# Patient Record
Sex: MALE | Race: WHITE | NOT HISPANIC OR LATINO | URBAN - METROPOLITAN AREA
[De-identification: names, ages, dates, MRNs, and addresses within clinical notes are randomized per-mention and may not be internally consistent; named-entity substitution may affect disease eponyms.]

---

## 2023-03-20 ENCOUNTER — TELEPHONE (OUTPATIENT)
Dept: PRIMARY CARE | Facility: CLINIC | Age: 43
End: 2023-03-20
Payer: COMMERCIAL

## 2023-03-20 DIAGNOSIS — N52.9 VASCULOGENIC ERECTILE DYSFUNCTION, UNSPECIFIED VASCULOGENIC ERECTILE DYSFUNCTION TYPE: Primary | ICD-10-CM

## 2023-03-20 RX ORDER — SILDENAFIL 50 MG/1
100 TABLET, FILM COATED ORAL AS NEEDED
Qty: 10 TABLET | Refills: 3 | Status: SHIPPED | OUTPATIENT
Start: 2023-03-20 | End: 2023-05-30 | Stop reason: SDUPTHER

## 2023-03-20 RX ORDER — SILDENAFIL 50 MG/1
100 TABLET, FILM COATED ORAL AS NEEDED
COMMUNITY
Start: 2023-02-28 | End: 2023-03-20 | Stop reason: SDUPTHER

## 2023-03-20 RX ORDER — ALBUTEROL SULFATE 90 UG/1
AEROSOL, METERED RESPIRATORY (INHALATION)
COMMUNITY
Start: 2021-07-19 | End: 2023-05-30 | Stop reason: ALTCHOICE

## 2023-03-20 RX ORDER — PROPRANOLOL HYDROCHLORIDE 20 MG/1
TABLET ORAL
COMMUNITY
Start: 2015-12-03 | End: 2023-05-30 | Stop reason: SDUPTHER

## 2023-04-25 DIAGNOSIS — E66.09 CLASS 2 OBESITY DUE TO EXCESS CALORIES WITHOUT SERIOUS COMORBIDITY WITH BODY MASS INDEX (BMI) OF 35.0 TO 35.9 IN ADULT: Primary | ICD-10-CM

## 2023-04-25 RX ORDER — PHENTERMINE HYDROCHLORIDE 37.5 MG/1
1 TABLET ORAL DAILY
COMMUNITY
Start: 2022-06-15 | End: 2023-04-25 | Stop reason: SDUPTHER

## 2023-05-05 RX ORDER — PHENTERMINE HYDROCHLORIDE 37.5 MG/1
37.5 TABLET ORAL DAILY
Qty: 30 TABLET | Refills: 0 | Status: SHIPPED | OUTPATIENT
Start: 2023-05-05 | End: 2023-05-30 | Stop reason: SDUPTHER

## 2023-05-15 ENCOUNTER — TELEPHONE (OUTPATIENT)
Dept: PRIMARY CARE | Facility: CLINIC | Age: 43
End: 2023-05-15
Payer: COMMERCIAL

## 2023-05-15 NOTE — TELEPHONE ENCOUNTER
Pt was here in February & is having bad back pain -across extreme  Lower back.  What would be the next step?  Has been doing   Stretching.  Please advise

## 2023-05-30 ENCOUNTER — OFFICE VISIT (OUTPATIENT)
Dept: PRIMARY CARE | Facility: CLINIC | Age: 43
End: 2023-05-30
Payer: COMMERCIAL

## 2023-05-30 VITALS
HEIGHT: 69 IN | OXYGEN SATURATION: 97 % | DIASTOLIC BLOOD PRESSURE: 80 MMHG | WEIGHT: 219 LBS | SYSTOLIC BLOOD PRESSURE: 120 MMHG | TEMPERATURE: 97.8 F | BODY MASS INDEX: 32.44 KG/M2 | HEART RATE: 100 BPM

## 2023-05-30 DIAGNOSIS — E66.09 CLASS 2 OBESITY DUE TO EXCESS CALORIES WITHOUT SERIOUS COMORBIDITY WITH BODY MASS INDEX (BMI) OF 35.0 TO 35.9 IN ADULT: ICD-10-CM

## 2023-05-30 DIAGNOSIS — E66.09 CLASS 1 OBESITY DUE TO EXCESS CALORIES WITHOUT SERIOUS COMORBIDITY WITH BODY MASS INDEX (BMI) OF 32.0 TO 32.9 IN ADULT: ICD-10-CM

## 2023-05-30 DIAGNOSIS — G89.29 CHRONIC LEFT-SIDED LOW BACK PAIN WITH LEFT-SIDED SCIATICA: Primary | ICD-10-CM

## 2023-05-30 DIAGNOSIS — K21.00 GASTROESOPHAGEAL REFLUX DISEASE WITH ESOPHAGITIS WITHOUT HEMORRHAGE: ICD-10-CM

## 2023-05-30 DIAGNOSIS — M54.42 CHRONIC LEFT-SIDED LOW BACK PAIN WITH LEFT-SIDED SCIATICA: Primary | ICD-10-CM

## 2023-05-30 DIAGNOSIS — F41.8 SITUATIONAL ANXIETY: ICD-10-CM

## 2023-05-30 DIAGNOSIS — N52.9 VASCULOGENIC ERECTILE DYSFUNCTION, UNSPECIFIED VASCULOGENIC ERECTILE DYSFUNCTION TYPE: ICD-10-CM

## 2023-05-30 PROBLEM — E66.811 CLASS 1 OBESITY DUE TO EXCESS CALORIES WITHOUT SERIOUS COMORBIDITY WITH BODY MASS INDEX (BMI) OF 32.0 TO 32.9 IN ADULT: Status: ACTIVE | Noted: 2023-05-30

## 2023-05-30 PROCEDURE — 3008F BODY MASS INDEX DOCD: CPT | Performed by: FAMILY MEDICINE

## 2023-05-30 PROCEDURE — 99214 OFFICE O/P EST MOD 30 MIN: CPT | Performed by: FAMILY MEDICINE

## 2023-05-30 PROCEDURE — 20552 NJX 1/MLT TRIGGER POINT 1/2: CPT | Performed by: FAMILY MEDICINE

## 2023-05-30 PROCEDURE — 1036F TOBACCO NON-USER: CPT | Performed by: FAMILY MEDICINE

## 2023-05-30 RX ORDER — METHYLPREDNISOLONE ACETATE 80 MG/ML
80 INJECTION, SUSPENSION INTRA-ARTICULAR; INTRALESIONAL; INTRAMUSCULAR; SOFT TISSUE ONCE
Status: COMPLETED | OUTPATIENT
Start: 2023-05-30 | End: 2023-05-30

## 2023-05-30 RX ORDER — PHENTERMINE HYDROCHLORIDE 37.5 MG/1
37.5 TABLET ORAL DAILY
Qty: 30 TABLET | Refills: 0 | Status: SHIPPED | OUTPATIENT
Start: 2023-05-30 | End: 2023-06-15 | Stop reason: SDUPTHER

## 2023-05-30 RX ORDER — SILDENAFIL 50 MG/1
100 TABLET, FILM COATED ORAL AS NEEDED
Qty: 30 TABLET | Refills: 3 | Status: SHIPPED | OUTPATIENT
Start: 2023-05-30 | End: 2023-08-18 | Stop reason: SDUPTHER

## 2023-05-30 RX ORDER — LIDOCAINE HYDROCHLORIDE 10 MG/ML
1 INJECTION INFILTRATION; PERINEURAL ONCE
Status: COMPLETED | OUTPATIENT
Start: 2023-05-30 | End: 2023-05-30

## 2023-05-30 RX ORDER — PROPRANOLOL HYDROCHLORIDE 20 MG/1
20 TABLET ORAL 3 TIMES DAILY
Qty: 90 TABLET | Refills: 11 | Status: SHIPPED | OUTPATIENT
Start: 2023-05-30 | End: 2024-03-05 | Stop reason: SDUPTHER

## 2023-05-30 RX ADMIN — METHYLPREDNISOLONE ACETATE 80 MG: 80 INJECTION, SUSPENSION INTRA-ARTICULAR; INTRALESIONAL; INTRAMUSCULAR; SOFT TISSUE at 16:12

## 2023-05-30 RX ADMIN — LIDOCAINE HYDROCHLORIDE 10 MG: 10 INJECTION INFILTRATION; PERINEURAL at 16:14

## 2023-05-30 ASSESSMENT — PATIENT HEALTH QUESTIONNAIRE - PHQ9
2. FEELING DOWN, DEPRESSED OR HOPELESS: NOT AT ALL
1. LITTLE INTEREST OR PLEASURE IN DOING THINGS: NOT AT ALL
SUM OF ALL RESPONSES TO PHQ9 QUESTIONS 1 & 2: 0

## 2023-05-30 ASSESSMENT — LIFESTYLE VARIABLES
HOW OFTEN DO YOU HAVE A DRINK CONTAINING ALCOHOL: NEVER
HOW OFTEN DO YOU HAVE SIX OR MORE DRINKS ON ONE OCCASION: NEVER

## 2023-05-30 ASSESSMENT — PAIN SCALES - GENERAL: PAINLEVEL: 4

## 2023-05-30 NOTE — PROGRESS NOTES
Subjective   Patient ID: Acosta Leigh is a 43 y.o. male who presents for Back Pain (Patient is here for lower back pain for months. ).  HPI  43-year-old male presents with multiple complaints.  He is dealing with left-sided low back pain with radiation down the left leg.  He has tried over-the-counter meds and ice with minimal improvement.  No fall or injury.  No lower extremity weakness.  No loss of bowel or bladder function.  He would also like to restart medication for obesity.  Requesting multiple medication refills.  Review of Systems   Musculoskeletal:  Positive for back pain.   Psychiatric/Behavioral:  The patient is nervous/anxious.    All other systems reviewed and are negative.      Visit Vitals  /80 (BP Location: Right arm, Patient Position: Sitting, BP Cuff Size: Adult)   Pulse 100   Temp 36.6 °C (97.8 °F) (Temporal)        Objective   Physical Exam  Vitals reviewed.   Constitutional:       Appearance: Normal appearance.   HENT:      Right Ear: Tympanic membrane normal.      Left Ear: Tympanic membrane normal.   Eyes:      Pupils: Pupils are equal, round, and reactive to light.   Cardiovascular:      Rate and Rhythm: Normal rate and regular rhythm.      Heart sounds: Normal heart sounds.   Pulmonary:      Breath sounds: Normal breath sounds.   Musculoskeletal:         General: Tenderness present.   Skin:     Findings: Rash present.   Neurological:      Mental Status: He is alert.      Motor: No weakness.      Gait: Gait normal.      Deep Tendon Reflexes: Reflexes normal.   Psychiatric:         Mood and Affect: Mood normal.         Behavior: Behavior normal.         Assessment/Plan   Problem List Items Addressed This Visit          Digestive    Gastroesophageal reflux disease with esophagitis without hemorrhage       Genitourinary    Vasculogenic erectile dysfunction    Relevant Medications    sildenafil (Viagra) 50 mg tablet       Endocrine/Metabolic    Class 1 obesity due to excess  calories without serious comorbidity with body mass index (BMI) of 32.0 to 32.9 in adult       Other    Chronic left-sided low back pain with left-sided sciatica - Primary    Relevant Medications    dexAMETHasone (Decadron) injection 4 mg (Completed)    methylPREDNISolone acetate (DEPO-Medrol) injection 80 mg (Completed)    lidocaine (Xylocaine) 10 mg/mL (1 %) injection 10 mg (Completed)    Other Relevant Orders    Inject trigger point, 1 or 2    XR lumbar spine complete 4+ views    Referral to Physical Therapy    Situational anxiety    Relevant Medications    propranolol (Inderal) 20 mg tablet     Other Visit Diagnoses       Class 2 obesity due to excess calories without serious comorbidity with body mass index (BMI) of 35.0 to 35.9 in adult        Relevant Medications    phentermine (Adipex-P) 37.5 mg tablet

## 2023-05-31 ASSESSMENT — ENCOUNTER SYMPTOMS
NERVOUS/ANXIOUS: 1
BACK PAIN: 1

## 2023-06-15 ENCOUNTER — OFFICE VISIT (OUTPATIENT)
Dept: PRIMARY CARE | Facility: CLINIC | Age: 43
End: 2023-06-15
Payer: COMMERCIAL

## 2023-06-15 VITALS
HEART RATE: 103 BPM | WEIGHT: 215 LBS | HEIGHT: 69 IN | DIASTOLIC BLOOD PRESSURE: 80 MMHG | SYSTOLIC BLOOD PRESSURE: 130 MMHG | BODY MASS INDEX: 31.84 KG/M2 | OXYGEN SATURATION: 98 % | TEMPERATURE: 97.7 F

## 2023-06-15 DIAGNOSIS — E66.09 CLASS 2 OBESITY DUE TO EXCESS CALORIES WITHOUT SERIOUS COMORBIDITY WITH BODY MASS INDEX (BMI) OF 35.0 TO 35.9 IN ADULT: ICD-10-CM

## 2023-06-15 DIAGNOSIS — E66.09 CLASS 1 OBESITY DUE TO EXCESS CALORIES WITHOUT SERIOUS COMORBIDITY WITH BODY MASS INDEX (BMI) OF 32.0 TO 32.9 IN ADULT: Primary | ICD-10-CM

## 2023-06-15 PROBLEM — R53.82 CHRONIC FATIGUE: Status: ACTIVE | Noted: 2023-06-15

## 2023-06-15 PROBLEM — R03.0 BORDERLINE HYPERTENSION: Status: ACTIVE | Noted: 2023-06-15

## 2023-06-15 PROBLEM — U07.1 PNEUMONIA DUE TO COVID-19 VIRUS: Status: ACTIVE | Noted: 2023-06-15

## 2023-06-15 PROBLEM — F41.1 GENERALIZED ANXIETY DISORDER: Status: ACTIVE | Noted: 2023-06-15

## 2023-06-15 PROBLEM — J45.909 RAD (REACTIVE AIRWAY DISEASE) (HHS-HCC): Status: ACTIVE | Noted: 2023-06-15

## 2023-06-15 PROBLEM — J18.9 COMMUNITY ACQUIRED PNEUMONIA: Status: ACTIVE | Noted: 2023-06-15

## 2023-06-15 PROBLEM — J01.90 ACUTE SINUSITIS: Status: ACTIVE | Noted: 2023-06-15

## 2023-06-15 PROBLEM — J02.9 SORE THROAT: Status: ACTIVE | Noted: 2023-06-15

## 2023-06-15 PROBLEM — J12.82 PNEUMONIA DUE TO COVID-19 VIRUS: Status: ACTIVE | Noted: 2023-06-15

## 2023-06-15 PROCEDURE — 99213 OFFICE O/P EST LOW 20 MIN: CPT | Performed by: FAMILY MEDICINE

## 2023-06-15 PROCEDURE — 1036F TOBACCO NON-USER: CPT | Performed by: FAMILY MEDICINE

## 2023-06-15 PROCEDURE — 3008F BODY MASS INDEX DOCD: CPT | Performed by: FAMILY MEDICINE

## 2023-06-15 RX ORDER — PHENTERMINE HYDROCHLORIDE 37.5 MG/1
37.5 TABLET ORAL DAILY
Qty: 30 TABLET | Refills: 0 | Status: SHIPPED | OUTPATIENT
Start: 2023-06-27 | End: 2024-03-05 | Stop reason: SDUPTHER

## 2023-06-15 ASSESSMENT — PAIN SCALES - GENERAL: PAINLEVEL: 4

## 2023-06-15 NOTE — PROGRESS NOTES
Subjective   Patient ID: Acosta Leigh is a 43 y.o. male who presents for Med Refill (Patient is here for med refill).  HPI  43-year-old male for follow-up on medication for obesity.  Medication working well.  No side effects.  Overall improved functioning medication  Review of Systems  Constitutional: no chills, no fever and no night sweats.   Cardiovascular: no chest pain, no intermittent leg claudication, no lower extremity edema, no palpitations and no syncope.   Respiratory: no cough, no shortness of breath during exertion, no shortness of breath at rest and no wheezing.   Integumentary: no new skin lesions and no rashes.   Neurological: no confusion, no dizziness and no headache.   Psychiatric: no anxiety, no personality change, no depression, no anhedonia, no sleep disturbances and no substance use disorders.   Endocrine: no recent weight loss.  Visit Vitals  /80 (BP Location: Left arm, Patient Position: Sitting, BP Cuff Size: Adult)   Pulse 103   Temp 36.5 °C (97.7 °F) (Temporal)        Objective   Physical Exam  Constitutional: Alert and in no acute distress. Well developed, well nourished.   Cardiovascular: Heart rate and rhythm were normal, normal S1 and S2, no gallops, no murmurs and no pericardial rub.   Pulmonary: No respiratory distress. Clear bilateral breath sounds.   Psychiatric: Judgment and insight: Intact. Mood and affect: Normal.  Assessment/Plan   Problem List Items Addressed This Visit       Class 1 obesity due to excess calories without serious comorbidity with body mass index (BMI) of 32.0 to 32.9 in adult - Primary     Other Visit Diagnoses       Class 2 obesity due to excess calories without serious comorbidity with body mass index (BMI) of 35.0 to 35.9 in adult        Relevant Medications    phentermine (Adipex-P) 37.5 mg tablet (Start on 6/27/2023)

## 2023-08-10 ENCOUNTER — TELEPHONE (OUTPATIENT)
Dept: PRIMARY CARE | Facility: CLINIC | Age: 43
End: 2023-08-10
Payer: COMMERCIAL

## 2023-08-10 NOTE — TELEPHONE ENCOUNTER
Pt was here in June & has completed PT on back & they told him to request  An mri.  Please advise  Ty

## 2023-08-18 ENCOUNTER — OFFICE VISIT (OUTPATIENT)
Dept: PRIMARY CARE | Facility: CLINIC | Age: 43
End: 2023-08-18
Payer: COMMERCIAL

## 2023-08-18 VITALS
BODY MASS INDEX: 32.73 KG/M2 | HEART RATE: 108 BPM | WEIGHT: 221 LBS | OXYGEN SATURATION: 95 % | DIASTOLIC BLOOD PRESSURE: 78 MMHG | HEIGHT: 69 IN | TEMPERATURE: 97.7 F | SYSTOLIC BLOOD PRESSURE: 110 MMHG

## 2023-08-18 DIAGNOSIS — M54.42 CHRONIC LEFT-SIDED LOW BACK PAIN WITH LEFT-SIDED SCIATICA: Primary | ICD-10-CM

## 2023-08-18 DIAGNOSIS — N52.9 VASCULOGENIC ERECTILE DYSFUNCTION, UNSPECIFIED VASCULOGENIC ERECTILE DYSFUNCTION TYPE: ICD-10-CM

## 2023-08-18 DIAGNOSIS — G89.29 CHRONIC LEFT-SIDED LOW BACK PAIN WITH LEFT-SIDED SCIATICA: Primary | ICD-10-CM

## 2023-08-18 PROCEDURE — 99214 OFFICE O/P EST MOD 30 MIN: CPT | Performed by: FAMILY MEDICINE

## 2023-08-18 PROCEDURE — 1036F TOBACCO NON-USER: CPT | Performed by: FAMILY MEDICINE

## 2023-08-18 PROCEDURE — 3008F BODY MASS INDEX DOCD: CPT | Performed by: FAMILY MEDICINE

## 2023-08-18 RX ORDER — SILDENAFIL 50 MG/1
100 TABLET, FILM COATED ORAL AS NEEDED
Qty: 60 TABLET | Refills: 3 | Status: SHIPPED | OUTPATIENT
Start: 2023-08-18 | End: 2024-01-22 | Stop reason: SDUPTHER

## 2023-08-18 ASSESSMENT — ENCOUNTER SYMPTOMS
NUMBNESS: 1
DIFFICULTY URINATING: 0
WEAKNESS: 0
BACK PAIN: 1

## 2023-08-18 ASSESSMENT — PAIN SCALES - GENERAL: PAINLEVEL: 2

## 2023-08-18 NOTE — PROGRESS NOTES
Subjective   Patient ID: Acosta Leigh is a 43 y.o. male who presents for Referral (Patient is here to discuss a referral for a MRI order. ).  HPI  43-year-old male presents with complaints of left-sided low back pain with radiation down the left leg.  This is a chronic condition going on for over 3 months.  We attempted trigger point injection as well as physical therapy.  Patient has had no proven his symptoms with conservative therapy.  Continues to have chronic left-sided low back pain with radiation down the left leg.  Numbness and tingling down the left leg.  Pain is interfering with his ability go to work.  No loss of bowel or bladder function.  Review of Systems   Genitourinary:  Negative for difficulty urinating.   Musculoskeletal:  Positive for back pain.   Neurological:  Positive for numbness. Negative for weakness.   All other systems reviewed and are negative.      Visit Vitals  /78 (BP Location: Left arm, Patient Position: Sitting, BP Cuff Size: Adult)   Pulse 108   Temp 36.5 °C (97.7 °F) (Temporal)        Objective   Physical Exam  Vitals reviewed.   Constitutional:       Appearance: Normal appearance.   Cardiovascular:      Rate and Rhythm: Normal rate and regular rhythm.      Heart sounds: Normal heart sounds.   Pulmonary:      Breath sounds: Normal breath sounds.   Musculoskeletal:         General: Tenderness present.   Neurological:      Mental Status: He is alert.      Sensory: Sensory deficit present.      Motor: No weakness.      Gait: Gait abnormal.      Deep Tendon Reflexes: Reflexes normal.   Psychiatric:         Mood and Affect: Mood normal.         Behavior: Behavior normal.         Assessment/Plan   Problem List Items Addressed This Visit       Chronic left-sided low back pain with left-sided sciatica - Primary    Relevant Orders    MR lumbar spine wo IV contrast    Vasculogenic erectile dysfunction    Relevant Medications    sildenafil (Viagra) 50 mg tablet     #1.  MRI  lumbar spine ordered due to failure of conservative therapy and worsening pain with radicular symptoms down the left leg.

## 2023-09-19 DIAGNOSIS — M54.42 CHRONIC LEFT-SIDED LOW BACK PAIN WITH LEFT-SIDED SCIATICA: Primary | ICD-10-CM

## 2023-09-19 DIAGNOSIS — G89.29 CHRONIC LEFT-SIDED LOW BACK PAIN WITH LEFT-SIDED SCIATICA: Primary | ICD-10-CM

## 2023-09-21 ENCOUNTER — OFFICE VISIT (OUTPATIENT)
Dept: PRIMARY CARE | Facility: CLINIC | Age: 43
End: 2023-09-21
Payer: COMMERCIAL

## 2023-09-21 VITALS
BODY MASS INDEX: 32.58 KG/M2 | TEMPERATURE: 97.5 F | WEIGHT: 220 LBS | DIASTOLIC BLOOD PRESSURE: 80 MMHG | SYSTOLIC BLOOD PRESSURE: 110 MMHG | OXYGEN SATURATION: 96 % | HEART RATE: 79 BPM | HEIGHT: 69 IN

## 2023-09-21 DIAGNOSIS — M51.36 DEGENERATIVE DISC DISEASE, LUMBAR: Primary | ICD-10-CM

## 2023-09-21 PROBLEM — M51.369 DEGENERATIVE DISC DISEASE, LUMBAR: Status: ACTIVE | Noted: 2023-09-21

## 2023-09-21 PROCEDURE — 1036F TOBACCO NON-USER: CPT | Performed by: FAMILY MEDICINE

## 2023-09-21 PROCEDURE — 3008F BODY MASS INDEX DOCD: CPT | Performed by: FAMILY MEDICINE

## 2023-09-21 PROCEDURE — 99213 OFFICE O/P EST LOW 20 MIN: CPT | Performed by: FAMILY MEDICINE

## 2023-09-21 ASSESSMENT — PAIN SCALES - GENERAL: PAINLEVEL: 4

## 2023-09-21 NOTE — PROGRESS NOTES
Subjective   Patient ID: Acosta Leigh is a 43 y.o. male who presents for Follow-up (Patient is here to discuss test results).  HPI  Patient for follow-up and MRI.  Bulging disc at multiple levels with foraminal narrowing.  Does have a appointment with pain management.  Review of Systems  See HPI  Visit Vitals  /80 (BP Location: Right arm, Patient Position: Sitting, BP Cuff Size: Adult)   Pulse 79   Temp 36.4 °C (97.5 °F) (Temporal)        Objective   Physical Exam  Vitals reviewed.   Constitutional:       Appearance: Normal appearance.   Cardiovascular:      Rate and Rhythm: Normal rate.      Heart sounds: Normal heart sounds.   Pulmonary:      Breath sounds: Normal breath sounds.   Neurological:      Mental Status: He is alert.         Assessment/Plan   Follow-up with pain management as scheduled.

## 2023-10-12 ENCOUNTER — OFFICE VISIT (OUTPATIENT)
Dept: PAIN MEDICINE | Facility: CLINIC | Age: 43
End: 2023-10-12
Payer: COMMERCIAL

## 2023-10-12 VITALS
WEIGHT: 215 LBS | HEART RATE: 50 BPM | SYSTOLIC BLOOD PRESSURE: 125 MMHG | DIASTOLIC BLOOD PRESSURE: 75 MMHG | BODY MASS INDEX: 31.84 KG/M2 | HEIGHT: 69 IN | TEMPERATURE: 96.8 F

## 2023-10-12 DIAGNOSIS — M51.26 LUMBAR DISC HERNIATION: Primary | ICD-10-CM

## 2023-10-12 DIAGNOSIS — M54.16 LUMBAR NEURITIS: ICD-10-CM

## 2023-10-12 PROCEDURE — 99214 OFFICE O/P EST MOD 30 MIN: CPT | Performed by: ANESTHESIOLOGY

## 2023-10-12 RX ORDER — GABAPENTIN 300 MG/1
CAPSULE ORAL
Qty: 60 CAPSULE | Refills: 3 | Status: SHIPPED | OUTPATIENT
Start: 2023-10-12 | End: 2024-01-22

## 2023-10-12 SDOH — ECONOMIC STABILITY: FOOD INSECURITY: WITHIN THE PAST 12 MONTHS, THE FOOD YOU BOUGHT JUST DIDN'T LAST AND YOU DIDN'T HAVE MONEY TO GET MORE.: NEVER TRUE

## 2023-10-12 SDOH — ECONOMIC STABILITY: FOOD INSECURITY: WITHIN THE PAST 12 MONTHS, YOU WORRIED THAT YOUR FOOD WOULD RUN OUT BEFORE YOU GOT MONEY TO BUY MORE.: NEVER TRUE

## 2023-10-12 ASSESSMENT — ENCOUNTER SYMPTOMS
DIZZINESS: 0
DIFFICULTY URINATING: 0
NUMBNESS: 1
CHILLS: 0
CHEST TIGHTNESS: 0
SEIZURES: 0
COUGH: 0
WEAKNESS: 0
WHEEZING: 0
FEVER: 0
NECK STIFFNESS: 0
BACK PAIN: 1
NAUSEA: 0
SPEECH DIFFICULTY: 0
DIAPHORESIS: 0
DIARRHEA: 0
EYE DISCHARGE: 0
NECK PAIN: 0
VOMITING: 0
SHORTNESS OF BREATH: 0
CONSTIPATION: 0
ARTHRALGIAS: 1
BLOOD IN STOOL: 0

## 2023-10-12 ASSESSMENT — LIFESTYLE VARIABLES
TOTAL SCORE: 2
AUDIT-C TOTAL SCORE: 1
HOW MANY STANDARD DRINKS CONTAINING ALCOHOL DO YOU HAVE ON A TYPICAL DAY: 1 OR 2
HOW OFTEN DO YOU HAVE SIX OR MORE DRINKS ON ONE OCCASION: NEVER
SKIP TO QUESTIONS 9-10: 1
HOW OFTEN DO YOU HAVE A DRINK CONTAINING ALCOHOL: MONTHLY OR LESS

## 2023-10-12 ASSESSMENT — COLUMBIA-SUICIDE SEVERITY RATING SCALE - C-SSRS
2. HAVE YOU ACTUALLY HAD ANY THOUGHTS OF KILLING YOURSELF?: NO
1. IN THE PAST MONTH, HAVE YOU WISHED YOU WERE DEAD OR WISHED YOU COULD GO TO SLEEP AND NOT WAKE UP?: NO
6. HAVE YOU EVER DONE ANYTHING, STARTED TO DO ANYTHING, OR PREPARED TO DO ANYTHING TO END YOUR LIFE?: NO

## 2023-10-12 ASSESSMENT — PATIENT HEALTH QUESTIONNAIRE - PHQ9
SUM OF ALL RESPONSES TO PHQ9 QUESTIONS 1 & 2: 0
1. LITTLE INTEREST OR PLEASURE IN DOING THINGS: NOT AT ALL
2. FEELING DOWN, DEPRESSED OR HOPELESS: NOT AT ALL

## 2023-10-12 ASSESSMENT — PAIN SCALES - GENERAL
PAINLEVEL: 2
PAINLEVEL_OUTOF10: 2

## 2023-10-12 ASSESSMENT — PAIN DESCRIPTION - DESCRIPTORS: DESCRIPTORS: TINGLING;SHOOTING

## 2023-10-12 ASSESSMENT — PAIN - FUNCTIONAL ASSESSMENT: PAIN_FUNCTIONAL_ASSESSMENT: 0-10
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
## 2023-10-12 NOTE — PROGRESS NOTES
History Of Present Illness  Acosta Leigh is a 43 y.o. male presenting with   Chief Complaint   Patient presents with    Back Pain    Leg Pain    Knee Pain    Hip Pain    Foot Pain        Patient presents with complaints of chronic low back pain to the L SIDED LOW BACK PAIN that radiates down his LLE. The pain is constant, worse with activity / sitting/twisting and better with rest. The pain is sharp, stabbing and shooting to the LLE. Denies LE saddle anesthesia, bowel or bladder incontinence. To manage this pain the patient has attempted ibuprofen with no relief of his pain.     PAIN SCORE: 2- 10 /10.    PCP: Dr. Stoll       Past Medical History  He has no past medical history on file.    Surgical History  He has a past surgical history that includes Wrist surgery (Right).     Social History  He reports that he has never smoked. He has never used smokeless tobacco. He reports current alcohol use. He reports that he does not use drugs.    Family History  Family History   Problem Relation Name Age of Onset    Colon cancer Maternal Grandfather          Allergies  Penicillin g and Penicillins    Review of Systems   Constitutional:  Negative for chills, diaphoresis and fever.   HENT:  Negative for ear discharge and tinnitus.    Eyes:  Negative for discharge.   Respiratory:  Negative for cough, chest tightness, shortness of breath and wheezing.    Cardiovascular:  Negative for chest pain.   Gastrointestinal:  Negative for blood in stool, constipation, diarrhea, nausea and vomiting.   Endocrine: Negative for polyuria.   Genitourinary:  Negative for difficulty urinating.   Musculoskeletal:  Positive for arthralgias, back pain and gait problem. Negative for neck pain and neck stiffness.   Skin:  Negative for rash.   Neurological:  Positive for numbness. Negative for dizziness, seizures, speech difficulty and weakness.       All other systems reviewed and negative for any deficits. Pertinent positives and  negatives were considered in the medical decision making process.        Physical Exam    General: Pt appears stated age    Eyes: Conjunctiva non-icteric and lids without obvious rash or drooping. Pupils are symmetric    ENT: External ears and nose appear to be without deformity or rash. No lesions or masses noted. Hearing is grossly intact    Neck: No JVD noted, tracheal position midline. No goiter noted on assessment of thyroid    Respiratory: No gasping or shortness of breath noted, no use of accessory muscles noted    Cardiovascular: Extremities show no edema or varicosities    Skin: No rashes or open lesions/ulcers identified on skin. No induration/tightening noted with palpation of skin    Musculoskeletal: Gait is grossly normal    Digits/nails show no clubbing or cyanosis    Exam of muscles/joints/bones shows no gross atrophy and no abnormal/involuntary movements in the head/neckNo asymmetry or masses noted in the head/neck    Stability: no subluxation noted on movement of bilateral upper extremities or head/neck    Strength: 5/5 in RLE and 5/5 LLE     Range of Motion: WNL     Neurologic: Reflexes: 2+     Sensation: WNL    Cranial nerves 2-12 are grossly intact    Psychiatric: Pt is alert and oriented to time, place and person.       Last Recorded Vitals  /75   Pulse 50   Temp 36 °C (96.8 °F)   Wt 97.5 kg (215 lb)      Assessment/Plan     1. Lumbar disc herniation  gabapentin (Neurontin) 300 mg capsule      2. Lumbar neuritis  gabapentin (Neurontin) 300 mg capsule          1. I have provided the patient with a list of physical therapy exercises to learn and perform to strengthen core, maintain stabilization, and reduce pain. We reviewed the exercises in detail and I encouraged them to perform them on a regular basis.    2. I would recommend the pt start on Gabapentin to help with nerve related pain. We discussed the risks, benefits, and side effects to this medication including the mechanism of  action and the pt understands and agrees.    3. I extensively reviewed the patients MRI findings (9-) in detail, including review of the actual images and provided a detailed explanation of the findings using a spine model.     There are degenerative changes at the L1/2 level with modic changes and a disc herniations most prominently at the L5/S1 level.     4.  The patient is a candidate for an LESI  L5/S1 low back and radicular pain. I spent time with the patient discussing all of the risks, benefits, and alternatives to this measure. Including but not limited to spinal infection, epidural hematoma/abscess, paralysis, nerve injury, steroid effects, and spinal headache. The patient understands and agrees to proceed as needed.         Isauro Kyle MD    I spent time with the patient reviewing their imaging and discussing the risks benefits and alternatives to the above plan. A total of 45 minutes was spent reviewing the data and greater than 50% of that time was with the patient during the face to face encounter discussing treatment options both surgical, non-surgical, and minimally invasive techniques.

## 2023-10-24 ENCOUNTER — TELEPHONE (OUTPATIENT)
Dept: PAIN MEDICINE | Facility: CLINIC | Age: 43
End: 2023-10-24
Payer: COMMERCIAL

## 2023-10-24 NOTE — TELEPHONE ENCOUNTER
Called patient to move injection from 10/27 to 11/9, would like some type of pain relief to hold him over until injection.

## 2023-10-27 ENCOUNTER — APPOINTMENT (OUTPATIENT)
Dept: PAIN MEDICINE | Facility: CLINIC | Age: 43
End: 2023-10-27
Payer: COMMERCIAL

## 2023-10-27 ENCOUNTER — APPOINTMENT (OUTPATIENT)
Dept: RADIOLOGY | Facility: CLINIC | Age: 43
End: 2023-10-27
Payer: COMMERCIAL

## 2023-11-09 ENCOUNTER — ANCILLARY PROCEDURE (OUTPATIENT)
Dept: RADIOLOGY | Facility: CLINIC | Age: 43
End: 2023-11-09
Payer: COMMERCIAL

## 2023-11-09 ENCOUNTER — HOSPITAL ENCOUNTER (OUTPATIENT)
Dept: PAIN MEDICINE | Facility: CLINIC | Age: 43
Discharge: HOME | End: 2023-11-09
Payer: COMMERCIAL

## 2023-11-09 VITALS
SYSTOLIC BLOOD PRESSURE: 125 MMHG | RESPIRATION RATE: 15 BRPM | DIASTOLIC BLOOD PRESSURE: 80 MMHG | HEART RATE: 58 BPM | TEMPERATURE: 97.2 F | OXYGEN SATURATION: 100 %

## 2023-11-09 DIAGNOSIS — M54.16 LUMBAR NEURITIS: ICD-10-CM

## 2023-11-09 PROCEDURE — A4216 STERILE WATER/SALINE, 10 ML: HCPCS

## 2023-11-09 PROCEDURE — 2500000005 HC RX 250 GENERAL PHARMACY W/O HCPCS

## 2023-11-09 PROCEDURE — 62323 NJX INTERLAMINAR LMBR/SAC: CPT | Performed by: ANESTHESIOLOGY

## 2023-11-09 PROCEDURE — 7100000009 HC PHASE TWO TIME - INITIAL BASE CHARGE

## 2023-11-09 PROCEDURE — 7100000010 HC PHASE TWO TIME - EACH INCREMENTAL 1 MINUTE

## 2023-11-09 PROCEDURE — 77003 FLUOROGUIDE FOR SPINE INJECT: CPT

## 2023-11-09 PROCEDURE — 2500000004 HC RX 250 GENERAL PHARMACY W/ HCPCS (ALT 636 FOR OP/ED)

## 2023-11-09 RX ORDER — LIDOCAINE HYDROCHLORIDE 5 MG/ML
INJECTION, SOLUTION INFILTRATION; INTRAVENOUS
Status: COMPLETED
Start: 2023-11-09 | End: 2023-11-09

## 2023-11-09 RX ORDER — SODIUM CHLORIDE 9 MG/ML
INJECTION, SOLUTION INTRAMUSCULAR; INTRAVENOUS; SUBCUTANEOUS
Status: COMPLETED
Start: 2023-11-09 | End: 2023-11-09

## 2023-11-09 RX ORDER — TRIAMCINOLONE ACETONIDE 40 MG/ML
INJECTION, SUSPENSION INTRA-ARTICULAR; INTRAMUSCULAR
Status: COMPLETED
Start: 2023-11-09 | End: 2023-11-09

## 2023-11-09 RX ADMIN — TRIAMCINOLONE ACETONIDE 40 MG: 40 INJECTION, SUSPENSION INTRA-ARTICULAR; INTRAMUSCULAR at 13:58

## 2023-11-09 RX ADMIN — LIDOCAINE HYDROCHLORIDE 250 MG: 5 INJECTION, SOLUTION INFILTRATION at 13:57

## 2023-11-09 RX ADMIN — SODIUM CHLORIDE 10 ML: 9 INJECTION, SOLUTION INTRAMUSCULAR; INTRAVENOUS; SUBCUTANEOUS at 13:58

## 2023-11-09 ASSESSMENT — PAIN - FUNCTIONAL ASSESSMENT
PAIN_FUNCTIONAL_ASSESSMENT: 0-10
PAIN_FUNCTIONAL_ASSESSMENT: 0-10

## 2023-11-09 ASSESSMENT — PAIN SCALES - GENERAL
PAINLEVEL_OUTOF10: 4
PAINLEVEL_OUTOF10: 0 - NO PAIN

## 2023-11-09 ASSESSMENT — COLUMBIA-SUICIDE SEVERITY RATING SCALE - C-SSRS
6. HAVE YOU EVER DONE ANYTHING, STARTED TO DO ANYTHING, OR PREPARED TO DO ANYTHING TO END YOUR LIFE?: NO
1. IN THE PAST MONTH, HAVE YOU WISHED YOU WERE DEAD OR WISHED YOU COULD GO TO SLEEP AND NOT WAKE UP?: NO
2. HAVE YOU ACTUALLY HAD ANY THOUGHTS OF KILLING YOURSELF?: NO

## 2023-11-09 NOTE — OP NOTE
11/9/2023    Pre procedure Diagnosis: Lumbar neuritis  Post procedure Diagnosis: Lumbar neuritis    Procedure:     1. L5/S1 interlaminar epidural steroid injection   2. Fluoroscopic guidance     Complications: None    Assistants: None     EBL: None    PROCEDURE: The patient was identified in the preoperative area. After risks and benefits were explained, informed consent was obtained. The patient was brought back to the operating room and placed in the prone position on the operating table. Standard ASA monitors were applied and monitored throughout the procedure. Their vital signs remained stable throughout the procedure. The patient's lumbosacral spine was prepped and draped in usual sterile fashion. Using fluoroscopic guidance the skin overlying the trajectory to the L5/S1 space was anesthetized with a total of 5 ml of 0.5% Lidocaine. Thereafter, a 3.5 in long 18G Touhy needle was advanced through the anesthitized skin. Then, the needle was advanced into the L5/S1 ligamentum flavum under multiplanar fluoroscopic guidance.  Then using a loss of resistance syringe and technique the epidural space was accessed. After negative aspiration for heme, or CSF a total of 4mL of Preservative Free Normal Saline and 40 mg of KENALOG was injected. The needle was removed and a sterile dressing was applied. The patient tolerated the procedure well and was transported to PACU in stable condition.    PLAN: The pt will follow up in the office in one to two months to report their results with the procedure. Discharge instructions were reviewed in recovery and provided to the patient in writing. They were advised to call should they have any questions or concerns.

## 2023-12-11 ENCOUNTER — APPOINTMENT (OUTPATIENT)
Dept: PAIN MEDICINE | Facility: CLINIC | Age: 43
End: 2023-12-11
Payer: COMMERCIAL

## 2024-01-15 ENCOUNTER — APPOINTMENT (OUTPATIENT)
Dept: PAIN MEDICINE | Facility: CLINIC | Age: 44
End: 2024-01-15
Payer: COMMERCIAL

## 2024-01-22 ENCOUNTER — OFFICE VISIT (OUTPATIENT)
Dept: PAIN MEDICINE | Facility: CLINIC | Age: 44
End: 2024-01-22
Payer: COMMERCIAL

## 2024-01-22 VITALS
TEMPERATURE: 95.5 F | WEIGHT: 215 LBS | HEART RATE: 62 BPM | HEIGHT: 69 IN | BODY MASS INDEX: 31.84 KG/M2 | DIASTOLIC BLOOD PRESSURE: 76 MMHG | RESPIRATION RATE: 18 BRPM | SYSTOLIC BLOOD PRESSURE: 133 MMHG

## 2024-01-22 DIAGNOSIS — N52.9 VASCULOGENIC ERECTILE DYSFUNCTION, UNSPECIFIED VASCULOGENIC ERECTILE DYSFUNCTION TYPE: ICD-10-CM

## 2024-01-22 DIAGNOSIS — M54.16 LUMBAR NEURITIS: ICD-10-CM

## 2024-01-22 DIAGNOSIS — G89.29 CHRONIC LEFT-SIDED LOW BACK PAIN WITH LEFT-SIDED SCIATICA: Primary | ICD-10-CM

## 2024-01-22 DIAGNOSIS — M54.42 CHRONIC LEFT-SIDED LOW BACK PAIN WITH LEFT-SIDED SCIATICA: Primary | ICD-10-CM

## 2024-01-22 DIAGNOSIS — M51.36 DEGENERATIVE DISC DISEASE, LUMBAR: ICD-10-CM

## 2024-01-22 PROCEDURE — 99214 OFFICE O/P EST MOD 30 MIN: CPT | Performed by: ANESTHESIOLOGY

## 2024-01-22 RX ORDER — SILDENAFIL 50 MG/1
100 TABLET, FILM COATED ORAL AS NEEDED
Qty: 60 TABLET | Refills: 3 | Status: SHIPPED | OUTPATIENT
Start: 2024-01-22 | End: 2024-03-05 | Stop reason: SDUPTHER

## 2024-01-22 RX ORDER — GABAPENTIN 600 MG/1
600 TABLET ORAL 3 TIMES DAILY
Qty: 90 TABLET | Refills: 11 | Status: SHIPPED | OUTPATIENT
Start: 2024-01-22 | End: 2025-01-21

## 2024-01-22 ASSESSMENT — ENCOUNTER SYMPTOMS
DIARRHEA: 0
DIAPHORESIS: 0
COUGH: 0
ARTHRALGIAS: 1
LOSS OF SENSATION IN FEET: 1
BACK PAIN: 1
NUMBNESS: 1
CHEST TIGHTNESS: 0
NECK PAIN: 0
DEPRESSION: 0
FEVER: 0
DIFFICULTY URINATING: 0
DIZZINESS: 0
SEIZURES: 0
NECK STIFFNESS: 0
WHEEZING: 0
BLOOD IN STOOL: 0
WEAKNESS: 0
CHILLS: 0
CONSTIPATION: 0
SPEECH DIFFICULTY: 0
OCCASIONAL FEELINGS OF UNSTEADINESS: 0
EYE DISCHARGE: 0
NAUSEA: 0
VOMITING: 0
SHORTNESS OF BREATH: 0

## 2024-01-22 ASSESSMENT — PATIENT HEALTH QUESTIONNAIRE - PHQ9
1. LITTLE INTEREST OR PLEASURE IN DOING THINGS: NOT AT ALL
2. FEELING DOWN, DEPRESSED OR HOPELESS: NOT AT ALL
SUM OF ALL RESPONSES TO PHQ9 QUESTIONS 1 AND 2: 0

## 2024-01-22 ASSESSMENT — LIFESTYLE VARIABLES
AUDIT-C TOTAL SCORE: 2
HOW OFTEN DO YOU HAVE SIX OR MORE DRINKS ON ONE OCCASION: LESS THAN MONTHLY
SKIP TO QUESTIONS 9-10: 0
HOW MANY STANDARD DRINKS CONTAINING ALCOHOL DO YOU HAVE ON A TYPICAL DAY: 1 OR 2
TOTAL SCORE: 1
HOW OFTEN DO YOU HAVE A DRINK CONTAINING ALCOHOL: MONTHLY OR LESS

## 2024-01-22 ASSESSMENT — PAIN SCALES - GENERAL
PAINLEVEL_OUTOF10: 8
PAINLEVEL: 8

## 2024-01-22 ASSESSMENT — PAIN DESCRIPTION - DESCRIPTORS: DESCRIPTORS: SHOOTING;ACHING;DISCOMFORT

## 2024-01-22 ASSESSMENT — COLUMBIA-SUICIDE SEVERITY RATING SCALE - C-SSRS
1. IN THE PAST MONTH, HAVE YOU WISHED YOU WERE DEAD OR WISHED YOU COULD GO TO SLEEP AND NOT WAKE UP?: NO
2. HAVE YOU ACTUALLY HAD ANY THOUGHTS OF KILLING YOURSELF?: NO
6. HAVE YOU EVER DONE ANYTHING, STARTED TO DO ANYTHING, OR PREPARED TO DO ANYTHING TO END YOUR LIFE?: NO

## 2024-01-22 ASSESSMENT — PAIN - FUNCTIONAL ASSESSMENT: PAIN_FUNCTIONAL_ASSESSMENT: 0-10

## 2024-01-22 NOTE — PROGRESS NOTES
History Of Present Illness  Acosta Leigh is a 43 y.o. male presenting with   Chief Complaint   Patient presents with    Follow-up     For LESI ON 11/9/23 with 90% relief that lasted 2 weeks, pain is back in low left back and when turn wrong shoots down left left. Tried heat and ice and it does not help.        Patient returns with complaints of chronic low back pain to the L SIDED LOW BACK PAIN that radiates down his LLE. The pain is constant, worse with activity / sitting/twisting and better with rest. The pain is sharp, stabbing and shooting to the LLE. Denies LE saddle anesthesia, bowel or bladder incontinence. To manage this pain the patient has attempted ibuprofen with no relief of his pain.     PAIN SCORE: 2- 8 /10.    PCP: Dr. Stoll       Past Medical History  He has no past medical history on file.    Surgical History  He has a past surgical history that includes Wrist surgery (Right).     Social History  He reports that he has never smoked. He has never used smokeless tobacco. He reports current alcohol use. He reports that he does not use drugs.    Family History  Family History   Problem Relation Name Age of Onset    Colon cancer Maternal Grandfather          Allergies  Penicillin g and Penicillins    Review of Systems   Constitutional:  Negative for chills, diaphoresis and fever.   HENT:  Negative for ear discharge and tinnitus.    Eyes:  Negative for discharge.   Respiratory:  Negative for cough, chest tightness, shortness of breath and wheezing.    Cardiovascular:  Negative for chest pain.   Gastrointestinal:  Negative for blood in stool, constipation, diarrhea, nausea and vomiting.   Endocrine: Negative for polyuria.   Genitourinary:  Negative for difficulty urinating.   Musculoskeletal:  Positive for arthralgias, back pain and gait problem. Negative for neck pain and neck stiffness.   Skin:  Negative for rash.   Neurological:  Positive for numbness. Negative for dizziness, seizures,  speech difficulty and weakness.       All other systems reviewed and negative for any deficits. Pertinent positives and negatives were considered in the medical decision making process.        Physical Exam    General: Pt appears stated age    Eyes: Conjunctiva non-icteric and lids without obvious rash or drooping. Pupils are symmetric    ENT: External ears and nose appear to be without deformity or rash. No lesions or masses noted. Hearing is grossly intact    Neck: No JVD noted, tracheal position midline. No goiter noted on assessment of thyroid    Respiratory: No gasping or shortness of breath noted, no use of accessory muscles noted    Cardiovascular: Extremities show no edema or varicosities    Skin: No rashes or open lesions/ulcers identified on skin. No induration/tightening noted with palpation of skin    Musculoskeletal: Gait is grossly normal    Digits/nails show no clubbing or cyanosis    Exam of muscles/joints/bones shows no gross atrophy and no abnormal/involuntary movements in the head/neckNo asymmetry or masses noted in the head/neck    Stability: no subluxation noted on movement of bilateral upper extremities or head/neck    Strength: 5/5 in RLE and 5/5 LLE     Range of Motion: WNL     Neurologic: Reflexes: 2+     Sensation: WNL    Cranial nerves 2-12 are grossly intact    Psychiatric: Pt is alert and oriented to time, place and person.       Last Recorded Vitals  /76   Pulse 62   Temp 35.3 °C (95.5 °F)   Resp 18   Wt 97.5 kg (215 lb)      Assessment/Plan     No diagnosis found.      1. I have provided the patient with a list of physical therapy exercises to learn and perform to strengthen core, maintain stabilization, and reduce pain. We reviewed the exercises in detail and I encouraged them to perform them on a regular basis.    2. I would recommend the pt INCREASE HIS Gabapentin from 300mg bid to 600mg tid to help with nerve related pain. We discussed the risks, benefits, and side  effects to this medication including the mechanism of action and the pt understands and agrees.    3. I again extensively reviewed the patients MRI findings (9-) in detail, including review of the actual images and provided a detailed explanation of the findings using a spine model.     There are degenerative changes at the L1/2 level with modic changes and a disc herniations most prominently at the L5/S1 level.     4.  The patient is a candidate for an LESI  L5/S1 low back and radicular pain. I spent time with the patient discussing all of the risks, benefits, and alternatives to this measure. Including but not limited to spinal infection, epidural hematoma/abscess, paralysis, nerve injury, steroid effects, and spinal headache. The patient understands and agrees to proceed as needed.     His last injection was in November of 2023 and he reported greater than 90% relief of hsi pain that lasted for approx 1 month. He hwas able to sit and stand with less pain, however, it has since worn off.     Isauro Kyle MD    I spent time with the patient reviewing their imaging and discussing the risks benefits and alternatives to the above plan. A total of 45 minutes was spent reviewing the data and greater than 50% of that time was with the patient during the face to face encounter discussing treatment options both surgical, non-surgical, and minimally invasive techniques.

## 2024-02-09 ENCOUNTER — HOSPITAL ENCOUNTER (OUTPATIENT)
Dept: PAIN MEDICINE | Facility: CLINIC | Age: 44
Discharge: HOME | End: 2024-02-09
Payer: COMMERCIAL

## 2024-02-09 ENCOUNTER — HOSPITAL ENCOUNTER (OUTPATIENT)
Dept: RADIOLOGY | Facility: CLINIC | Age: 44
Discharge: HOME | End: 2024-02-09
Payer: COMMERCIAL

## 2024-02-09 VITALS
HEART RATE: 91 BPM | TEMPERATURE: 97.3 F | RESPIRATION RATE: 15 BRPM | OXYGEN SATURATION: 98 % | SYSTOLIC BLOOD PRESSURE: 147 MMHG | DIASTOLIC BLOOD PRESSURE: 83 MMHG

## 2024-02-09 DIAGNOSIS — M54.16 LUMBAR NEURITIS: ICD-10-CM

## 2024-02-09 PROCEDURE — A4216 STERILE WATER/SALINE, 10 ML: HCPCS

## 2024-02-09 PROCEDURE — 2500000005 HC RX 250 GENERAL PHARMACY W/O HCPCS

## 2024-02-09 PROCEDURE — 62323 NJX INTERLAMINAR LMBR/SAC: CPT

## 2024-02-09 PROCEDURE — 2500000004 HC RX 250 GENERAL PHARMACY W/ HCPCS (ALT 636 FOR OP/ED)

## 2024-02-09 PROCEDURE — 77003 FLUOROGUIDE FOR SPINE INJECT: CPT

## 2024-02-09 RX ORDER — LIDOCAINE HYDROCHLORIDE 5 MG/ML
INJECTION, SOLUTION INFILTRATION; INTRAVENOUS
Status: COMPLETED
Start: 2024-02-09 | End: 2024-02-09

## 2024-02-09 RX ORDER — TRIAMCINOLONE ACETONIDE 40 MG/ML
INJECTION, SUSPENSION INTRA-ARTICULAR; INTRAMUSCULAR
Status: COMPLETED
Start: 2024-02-09 | End: 2024-02-09

## 2024-02-09 RX ORDER — SODIUM CHLORIDE 9 MG/ML
INJECTION, SOLUTION INTRAMUSCULAR; INTRAVENOUS; SUBCUTANEOUS
Status: COMPLETED
Start: 2024-02-09 | End: 2024-02-09

## 2024-02-09 RX ADMIN — TRIAMCINOLONE ACETONIDE 40 MG: 40 INJECTION, SUSPENSION INTRA-ARTICULAR; INTRAMUSCULAR at 14:41

## 2024-02-09 RX ADMIN — LIDOCAINE HYDROCHLORIDE 250 MG: 5 INJECTION, SOLUTION INFILTRATION at 14:41

## 2024-02-09 RX ADMIN — SODIUM CHLORIDE 10 ML: 9 INJECTION, SOLUTION INTRAMUSCULAR; INTRAVENOUS; SUBCUTANEOUS at 14:41

## 2024-02-09 ASSESSMENT — ENCOUNTER SYMPTOMS
DEPRESSION: 0
OCCASIONAL FEELINGS OF UNSTEADINESS: 0
LOSS OF SENSATION IN FEET: 0

## 2024-02-09 ASSESSMENT — PATIENT HEALTH QUESTIONNAIRE - PHQ9
SUM OF ALL RESPONSES TO PHQ9 QUESTIONS 1 AND 2: 0
1. LITTLE INTEREST OR PLEASURE IN DOING THINGS: NOT AT ALL
2. FEELING DOWN, DEPRESSED OR HOPELESS: NOT AT ALL

## 2024-02-09 ASSESSMENT — PAIN DESCRIPTION - DESCRIPTORS: DESCRIPTORS: ACHING;BURNING

## 2024-02-09 ASSESSMENT — PAIN - FUNCTIONAL ASSESSMENT
PAIN_FUNCTIONAL_ASSESSMENT: 0-10
PAIN_FUNCTIONAL_ASSESSMENT: 0-10

## 2024-02-09 ASSESSMENT — PAIN SCALES - GENERAL
PAINLEVEL_OUTOF10: 5 - MODERATE PAIN
PAINLEVEL_OUTOF10: 4

## 2024-02-09 NOTE — OP NOTE
2/9/2024    Pre procedure Diagnosis: Lumbar neuritis  Post procedure Diagnosis: Lumbar neuritis    Procedure:     1. L5/S1 interlaminar epidural steroid injection   2. Fluoroscopic guidance     Complications: None    Assistants: None     EBL: None    PROCEDURE: The patient was identified in the preoperative area. After risks and benefits were explained, informed consent was obtained. The patient was brought back to the operating room and placed in the prone position on the operating table. Standard ASA monitors were applied and monitored throughout the procedure. Their vital signs remained stable throughout the procedure. The patient's lumbosacral spine was prepped and draped in usual sterile fashion. Using fluoroscopic guidance the skin overlying the trajectory to the L5/S1 space was anesthetized with a total of 5 ml of 0.5% Lidocaine. Thereafter, a 3.5 in long 20G Touhy needle was advanced through the anesthitized skin. Then, the needle was advanced into the L5/S1 ligamentum flavum under multiplanar fluoroscopic guidance.  Then using a loss of resistance syringe and technique the epidural space was accessed. After negative aspiration for heme, or CSF a total of 4mL of Preservative Free Normal Saline and 40 mg of KENALOG was injected. The needle was removed and a sterile dressing was applied. The patient tolerated the procedure well and was transported to PACU in good condition.    PLAN: The pt will follow up in the office in one to two months to report their results with the procedure. Discharge instructions were reviewed in recovery and provided to the patient in writing. They were advised to call should they have any questions or concerns.

## 2024-03-05 ENCOUNTER — OFFICE VISIT (OUTPATIENT)
Dept: PRIMARY CARE | Facility: CLINIC | Age: 44
End: 2024-03-05
Payer: COMMERCIAL

## 2024-03-05 VITALS
WEIGHT: 226 LBS | TEMPERATURE: 96.9 F | OXYGEN SATURATION: 97 % | HEIGHT: 69 IN | SYSTOLIC BLOOD PRESSURE: 138 MMHG | DIASTOLIC BLOOD PRESSURE: 84 MMHG | BODY MASS INDEX: 33.47 KG/M2 | HEART RATE: 102 BPM

## 2024-03-05 DIAGNOSIS — E66.09 CLASS 2 OBESITY DUE TO EXCESS CALORIES WITHOUT SERIOUS COMORBIDITY WITH BODY MASS INDEX (BMI) OF 35.0 TO 35.9 IN ADULT: ICD-10-CM

## 2024-03-05 DIAGNOSIS — M51.36 DEGENERATIVE DISC DISEASE, LUMBAR: ICD-10-CM

## 2024-03-05 DIAGNOSIS — N52.9 VASCULOGENIC ERECTILE DYSFUNCTION, UNSPECIFIED VASCULOGENIC ERECTILE DYSFUNCTION TYPE: ICD-10-CM

## 2024-03-05 DIAGNOSIS — E66.09 CLASS 1 OBESITY DUE TO EXCESS CALORIES WITHOUT SERIOUS COMORBIDITY WITH BODY MASS INDEX (BMI) OF 32.0 TO 32.9 IN ADULT: Primary | ICD-10-CM

## 2024-03-05 DIAGNOSIS — F41.8 SITUATIONAL ANXIETY: ICD-10-CM

## 2024-03-05 PROCEDURE — 1036F TOBACCO NON-USER: CPT | Performed by: FAMILY MEDICINE

## 2024-03-05 PROCEDURE — 3008F BODY MASS INDEX DOCD: CPT | Performed by: FAMILY MEDICINE

## 2024-03-05 PROCEDURE — 99214 OFFICE O/P EST MOD 30 MIN: CPT | Performed by: FAMILY MEDICINE

## 2024-03-05 RX ORDER — PROPRANOLOL HYDROCHLORIDE 20 MG/1
20 TABLET ORAL 3 TIMES DAILY
Qty: 90 TABLET | Refills: 11 | Status: SHIPPED | OUTPATIENT
Start: 2024-03-05 | End: 2025-03-05

## 2024-03-05 RX ORDER — SILDENAFIL 50 MG/1
100 TABLET, FILM COATED ORAL AS NEEDED
Qty: 60 TABLET | Refills: 3 | Status: SHIPPED | OUTPATIENT
Start: 2024-03-05 | End: 2024-03-29 | Stop reason: SDUPTHER

## 2024-03-05 RX ORDER — PHENTERMINE HYDROCHLORIDE 37.5 MG/1
37.5 TABLET ORAL DAILY
Qty: 30 TABLET | Refills: 0 | Status: SHIPPED | OUTPATIENT
Start: 2024-03-05 | End: 2024-03-29 | Stop reason: SDUPTHER

## 2024-03-05 ASSESSMENT — PAIN SCALES - GENERAL: PAINLEVEL: 2

## 2024-03-05 NOTE — PROGRESS NOTES
Subjective   Patient ID: Acosta Leigh is a 43 y.o. male who presents for Med Refill (Patient is here for med refill and to discuss going back on a medication.).  HPI  43-year-old male for refill on medication for class I obesity, erectile dysfunction, situational anxiety.  Review of Systems  Constitutional: no chills, no fever and no night sweats.   Eyes: no blurred vision and no eyesight problems.   ENT: no hearing loss, no nasal congestion, no nasal discharge, no hoarseness and no sore throat.   Cardiovascular: no chest pain, no intermittent leg claudication, no lower extremity edema, no palpitations and no syncope.   Respiratory: no cough, no shortness of breath during exertion, no shortness of breath at rest and no wheezing.   Gastrointestinal: no abdominal pain, no blood in stools, no constipation, no diarrhea, no melena, no nausea, no rectal pain and no vomiting.   Genitourinary: no dysuria, no change in urinary frequency, no urinary hesitancy and no feelings of urinary urgency.   Neurological: no difficulty walking, no headache, no limb weakness, no numbness and no tingling.   Psychiatric: no anxiety, no depression, no anhedonia and no substance use disorders.   Endocrine: no recent weight gain and no recent weight loss.   Hematologic/Lymphatic: no tendency for easy bruising and no swollen glands.  Visit Vitals  /84 (BP Location: Left arm, Patient Position: Sitting, BP Cuff Size: Adult)   Pulse 102   Temp 36.1 °C (96.9 °F) (Temporal)        Objective   Physical Exam  Constitutional: Alert and in no acute distress. Well developed, well nourished.   Eyes: Pupils were equal in size, round, reactive to light (PERRL) with normal accommodation and extraocular movements intact (EOMI). Ophthalmoscopic examination: Normal.   Ears, Nose, Mouth, and Throat: External inspection of ears and nose: Normal. Otoscopic examination: Normal. Lips, teeth, and gums: Normal. Oropharynx: Normal.   Neck: No neck mass  was observed. Supple. Thyroid not enlarged and there were no palpable thyroid nodules.   Cardiovascular: Heart rate and rhythm were normal, normal S1 and S2, no gallops, no murmurs and no pericardial rub. Carotid pulses: Normal with no bruits. Abdominal aorta: Normal. Pedal pulses: Normal. No peripheral edema.   Pulmonary: No respiratory distress. Clear bilateral breath sounds.   Abdomen: Soft nontender; no abdominal mass palpated. Normal bowel sounds. No organomegaly.   Skin: Normal skin color and pigmentation, normal skin turgor, and no rash.   Psychiatric: Judgment and insight: Intact. Mood and affect: Normal.  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

## 2024-03-29 ENCOUNTER — LAB (OUTPATIENT)
Dept: LAB | Facility: LAB | Age: 44
End: 2024-03-29
Payer: COMMERCIAL

## 2024-03-29 ENCOUNTER — OFFICE VISIT (OUTPATIENT)
Dept: PRIMARY CARE | Facility: CLINIC | Age: 44
End: 2024-03-29
Payer: COMMERCIAL

## 2024-03-29 VITALS
WEIGHT: 218 LBS | HEART RATE: 65 BPM | HEIGHT: 69 IN | OXYGEN SATURATION: 97 % | BODY MASS INDEX: 32.29 KG/M2 | DIASTOLIC BLOOD PRESSURE: 86 MMHG | SYSTOLIC BLOOD PRESSURE: 134 MMHG

## 2024-03-29 DIAGNOSIS — E55.9 VITAMIN D DEFICIENCY: ICD-10-CM

## 2024-03-29 DIAGNOSIS — Z00.00 ROUTINE GENERAL MEDICAL EXAMINATION AT A HEALTH CARE FACILITY: ICD-10-CM

## 2024-03-29 DIAGNOSIS — J45.20 MILD INTERMITTENT REACTIVE AIRWAY DISEASE WITHOUT COMPLICATION (HHS-HCC): ICD-10-CM

## 2024-03-29 DIAGNOSIS — E66.09 CLASS 2 OBESITY DUE TO EXCESS CALORIES WITHOUT SERIOUS COMORBIDITY WITH BODY MASS INDEX (BMI) OF 35.0 TO 35.9 IN ADULT: ICD-10-CM

## 2024-03-29 DIAGNOSIS — N52.9 VASCULOGENIC ERECTILE DYSFUNCTION, UNSPECIFIED VASCULOGENIC ERECTILE DYSFUNCTION TYPE: ICD-10-CM

## 2024-03-29 DIAGNOSIS — Z00.00 ROUTINE GENERAL MEDICAL EXAMINATION AT A HEALTH CARE FACILITY: Primary | ICD-10-CM

## 2024-03-29 DIAGNOSIS — E66.09 CLASS 1 OBESITY DUE TO EXCESS CALORIES WITHOUT SERIOUS COMORBIDITY WITH BODY MASS INDEX (BMI) OF 32.0 TO 32.9 IN ADULT: ICD-10-CM

## 2024-03-29 LAB
25(OH)D3 SERPL-MCNC: 30 NG/ML (ref 30–100)
ALBUMIN SERPL BCP-MCNC: 4.3 G/DL (ref 3.4–5)
ALP SERPL-CCNC: 46 U/L (ref 33–120)
ALT SERPL W P-5'-P-CCNC: 36 U/L (ref 10–52)
ANION GAP SERPL CALC-SCNC: 13 MMOL/L (ref 10–20)
AST SERPL W P-5'-P-CCNC: 20 U/L (ref 9–39)
BASOPHILS # BLD AUTO: 0.06 X10*3/UL (ref 0–0.1)
BASOPHILS NFR BLD AUTO: 1 %
BILIRUB SERPL-MCNC: 0.5 MG/DL (ref 0–1.2)
BUN SERPL-MCNC: 14 MG/DL (ref 6–23)
CALCIUM SERPL-MCNC: 9.5 MG/DL (ref 8.6–10.6)
CHLORIDE SERPL-SCNC: 104 MMOL/L (ref 98–107)
CHOLEST SERPL-MCNC: 235 MG/DL (ref 0–199)
CHOLESTEROL/HDL RATIO: 5.5
CO2 SERPL-SCNC: 28 MMOL/L (ref 21–32)
CREAT SERPL-MCNC: 0.87 MG/DL (ref 0.5–1.3)
EGFRCR SERPLBLD CKD-EPI 2021: >90 ML/MIN/1.73M*2
EOSINOPHIL # BLD AUTO: 0.16 X10*3/UL (ref 0–0.7)
EOSINOPHIL NFR BLD AUTO: 2.6 %
ERYTHROCYTE [DISTWIDTH] IN BLOOD BY AUTOMATED COUNT: 13.2 % (ref 11.5–14.5)
EST. AVERAGE GLUCOSE BLD GHB EST-MCNC: 108 MG/DL
GLUCOSE SERPL-MCNC: 91 MG/DL (ref 74–99)
HBA1C MFR BLD: 5.4 %
HCT VFR BLD AUTO: 43.9 % (ref 41–52)
HDLC SERPL-MCNC: 42.9 MG/DL
HGB BLD-MCNC: 14.4 G/DL (ref 13.5–17.5)
IMM GRANULOCYTES # BLD AUTO: 0.06 X10*3/UL (ref 0–0.7)
IMM GRANULOCYTES NFR BLD AUTO: 1 % (ref 0–0.9)
LDLC SERPL CALC-MCNC: 159 MG/DL
LYMPHOCYTES # BLD AUTO: 2.09 X10*3/UL (ref 1.2–4.8)
LYMPHOCYTES NFR BLD AUTO: 33.9 %
MCH RBC QN AUTO: 28.9 PG (ref 26–34)
MCHC RBC AUTO-ENTMCNC: 32.8 G/DL (ref 32–36)
MCV RBC AUTO: 88 FL (ref 80–100)
MONOCYTES # BLD AUTO: 0.57 X10*3/UL (ref 0.1–1)
MONOCYTES NFR BLD AUTO: 9.2 %
NEUTROPHILS # BLD AUTO: 3.23 X10*3/UL (ref 1.2–7.7)
NEUTROPHILS NFR BLD AUTO: 52.3 %
NON HDL CHOLESTEROL: 192 MG/DL (ref 0–149)
NRBC BLD-RTO: 0 /100 WBCS (ref 0–0)
PLATELET # BLD AUTO: 248 X10*3/UL (ref 150–450)
POTASSIUM SERPL-SCNC: 4.3 MMOL/L (ref 3.5–5.3)
PROT SERPL-MCNC: 7 G/DL (ref 6.4–8.2)
RBC # BLD AUTO: 4.99 X10*6/UL (ref 4.5–5.9)
SODIUM SERPL-SCNC: 141 MMOL/L (ref 136–145)
TRIGL SERPL-MCNC: 168 MG/DL (ref 0–149)
VLDL: 34 MG/DL (ref 0–40)
WBC # BLD AUTO: 6.2 X10*3/UL (ref 4.4–11.3)

## 2024-03-29 PROCEDURE — 82306 VITAMIN D 25 HYDROXY: CPT

## 2024-03-29 PROCEDURE — 85025 COMPLETE CBC W/AUTO DIFF WBC: CPT

## 2024-03-29 PROCEDURE — 80053 COMPREHEN METABOLIC PANEL: CPT

## 2024-03-29 PROCEDURE — 83036 HEMOGLOBIN GLYCOSYLATED A1C: CPT

## 2024-03-29 PROCEDURE — 1036F TOBACCO NON-USER: CPT | Performed by: FAMILY MEDICINE

## 2024-03-29 PROCEDURE — 80061 LIPID PANEL: CPT

## 2024-03-29 PROCEDURE — 99396 PREV VISIT EST AGE 40-64: CPT | Performed by: FAMILY MEDICINE

## 2024-03-29 PROCEDURE — 36415 COLL VENOUS BLD VENIPUNCTURE: CPT

## 2024-03-29 PROCEDURE — 3008F BODY MASS INDEX DOCD: CPT | Performed by: FAMILY MEDICINE

## 2024-03-29 RX ORDER — SILDENAFIL 50 MG/1
100 TABLET, FILM COATED ORAL AS NEEDED
Qty: 60 TABLET | Refills: 3 | Status: SHIPPED | OUTPATIENT
Start: 2024-03-29 | End: 2024-04-30 | Stop reason: SDUPTHER

## 2024-03-29 RX ORDER — PHENTERMINE HYDROCHLORIDE 37.5 MG/1
37.5 TABLET ORAL DAILY
Qty: 30 TABLET | Refills: 0 | Status: SHIPPED | OUTPATIENT
Start: 2024-03-29 | End: 2024-04-30 | Stop reason: SDUPTHER

## 2024-03-29 ASSESSMENT — PAIN SCALES - GENERAL: PAINLEVEL: 2

## 2024-03-29 NOTE — PROGRESS NOTES
Subjective   Patient ID: Acosta Leigh is a 43 y.o. male who presents for Annual Exam (Patient is present today for annual exam, med refill. Patient is fasting.).  HPI  43-year-old male for complete physical exam.  Refill on medication for obesity.  Review of Systems  Constitutional: no chills, no fever and no night sweats.   Eyes: no blurred vision and no eyesight problems.   ENT: no hearing loss, no nasal congestion, no nasal discharge, no hoarseness and no sore throat.   Cardiovascular: no chest pain, no intermittent leg claudication, no lower extremity edema, no palpitations and no syncope.   Respiratory: no cough, no shortness of breath during exertion, no shortness of breath at rest and no wheezing.   Gastrointestinal: no abdominal pain, no blood in stools, no constipation, no diarrhea, no melena, no nausea, no rectal pain and no vomiting.   Genitourinary: no dysuria, no change in urinary frequency, no urinary hesitancy and no feelings of urinary urgency.   Neurological: no difficulty walking, no headache, no limb weakness, no numbness and no tingling.   Psychiatric: no anxiety, no depression, no anhedonia and no substance use disorders.   Endocrine: no recent weight gain and no recent weight loss.   Hematologic/Lymphatic: no tendency for easy bruising and no swollen glands.  Visit Vitals  /86 (BP Location: Right leg, Patient Position: Sitting, BP Cuff Size: Adult)   Pulse 65        Objective   Physical Exam  Constitutional: Alert and in no acute distress. Well developed, well nourished.   Eyes: Pupils were equal in size, round, reactive to light (PERRL) with normal accommodation and extraocular movements intact (EOMI). Ophthalmoscopic examination: Normal.   Ears, Nose, Mouth, and Throat: External inspection of ears and nose: Normal. Otoscopic examination: Normal. Lips, teeth, and gums: Normal. Oropharynx: Normal.   Neck: No neck mass was observed. Supple. Thyroid not enlarged and there were no  palpable thyroid nodules.   Cardiovascular: Heart rate and rhythm were normal, normal S1 and S2, no gallops, no murmurs and no pericardial rub. Carotid pulses: Normal with no bruits. Abdominal aorta: Normal. Pedal pulses: Normal. No peripheral edema.   Pulmonary: No respiratory distress. Clear bilateral breath sounds.   Abdomen: Soft nontender; no abdominal mass palpated. Normal bowel sounds. No organomegaly.   Skin: Normal skin color and pigmentation, normal skin turgor, and no rash.   Psychiatric: Judgment and insight: Intact. Mood and affect: Normal.  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    Relevant Medications    sildenafil (Viagra) 50 mg tablet    Class 1 obesity due to excess calories without serious comorbidity with body mass index (BMI) of 32.0 to 32.9 in adult E66.09, Z68.32    RAD (reactive airway disease) J45.909    Routine general medical examination at a health care facility - Primary Z00.00    Relevant Orders    Lipid Panel    Comprehensive Metabolic Panel    CBC and Auto Differential    Hemoglobin A1c    Vitamin D deficiency E55.9    Relevant Orders    Vitamin D 25-Hydroxy,Total (for eval of Vitamin D levels)     Other Visit Diagnoses         Codes    Class 2 obesity due to excess calories without serious comorbidity with body mass index (BMI) of 35.0 to 35.9 in adult     E66.09, Z68.35    Relevant Medications    phentermine (Adipex-P) 37.5 mg tablet

## 2024-04-30 ENCOUNTER — OFFICE VISIT (OUTPATIENT)
Dept: PRIMARY CARE | Facility: CLINIC | Age: 44
End: 2024-04-30
Payer: COMMERCIAL

## 2024-04-30 VITALS
HEART RATE: 87 BPM | BODY MASS INDEX: 33.17 KG/M2 | WEIGHT: 224.6 LBS | DIASTOLIC BLOOD PRESSURE: 84 MMHG | SYSTOLIC BLOOD PRESSURE: 130 MMHG | OXYGEN SATURATION: 94 %

## 2024-04-30 DIAGNOSIS — N52.9 VASCULOGENIC ERECTILE DYSFUNCTION, UNSPECIFIED VASCULOGENIC ERECTILE DYSFUNCTION TYPE: ICD-10-CM

## 2024-04-30 DIAGNOSIS — E66.09 CLASS 2 OBESITY DUE TO EXCESS CALORIES WITHOUT SERIOUS COMORBIDITY WITH BODY MASS INDEX (BMI) OF 35.0 TO 35.9 IN ADULT: ICD-10-CM

## 2024-04-30 PROCEDURE — 1036F TOBACCO NON-USER: CPT | Performed by: FAMILY MEDICINE

## 2024-04-30 PROCEDURE — 3008F BODY MASS INDEX DOCD: CPT | Performed by: FAMILY MEDICINE

## 2024-04-30 PROCEDURE — 99214 OFFICE O/P EST MOD 30 MIN: CPT | Performed by: FAMILY MEDICINE

## 2024-04-30 RX ORDER — SILDENAFIL 50 MG/1
100 TABLET, FILM COATED ORAL AS NEEDED
Qty: 60 TABLET | Refills: 3 | Status: SHIPPED | OUTPATIENT
Start: 2024-04-30 | End: 2025-04-30

## 2024-04-30 RX ORDER — PHENTERMINE HYDROCHLORIDE 37.5 MG/1
37.5 TABLET ORAL DAILY
Qty: 30 TABLET | Refills: 0 | Status: SHIPPED | OUTPATIENT
Start: 2024-04-30

## 2024-04-30 ASSESSMENT — PAIN SCALES - GENERAL: PAINLEVEL: 2

## 2024-04-30 NOTE — PROGRESS NOTES
Subjective   Patient ID: Acosta Leigh is a 44 y.o. male who presents for Med Refill.  HPI  44-year-old male presents for refill on medication for obesity.  Medication working well.  No side effects.  Overall poor functional medication.  He also needs a renewal on medication for erectile dysfunction.  Medication working well.  He would like his testosterone checked.  Review of Systems  Constitutional: no chills, no fever and no night sweats.   Cardiovascular: no chest pain, no intermittent leg claudication, no lower extremity edema, no palpitations and no syncope.   Respiratory: no cough, no shortness of breath during exertion, no shortness of breath at rest and no wheezing.   Integumentary: no new skin lesions and no rashes.   Neurological: no confusion, no dizziness and no headache.   Psychiatric: no anxiety, no personality change, no depression, no anhedonia, no sleep disturbances and no substance use disorders.   Endocrine: no recent weight loss.  Visit Vitals  /84 (BP Location: Right arm, Patient Position: Sitting)   Pulse 87        Objective   Physical Exam  Constitutional: Alert and in no acute distress. Well developed, well nourished.   Cardiovascular: Heart rate and rhythm were normal, normal S1 and S2, no gallops, no murmurs and no pericardial rub.   Pulmonary: No respiratory distress. Clear bilateral breath sounds.   Psychiatric: Judgment and insight: Intact. Mood and affect: Normal.  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    Relevant Medications    sildenafil (Viagra) 50 mg tablet    Other Relevant Orders    Testosterone     Other Visit Diagnoses         Codes    Class 2 obesity due to excess calories without serious comorbidity with body mass index (BMI) of 35.0 to 35.9 in adult     E66.09, Z68.35    Relevant Medications    phentermine (Adipex-P) 37.5 mg tablet

## 2024-05-13 ENCOUNTER — OFFICE VISIT (OUTPATIENT)
Dept: PAIN MEDICINE | Facility: CLINIC | Age: 44
End: 2024-05-13
Payer: COMMERCIAL

## 2024-05-13 VITALS
RESPIRATION RATE: 15 BRPM | SYSTOLIC BLOOD PRESSURE: 138 MMHG | HEART RATE: 51 BPM | WEIGHT: 224 LBS | BODY MASS INDEX: 33.08 KG/M2 | DIASTOLIC BLOOD PRESSURE: 80 MMHG | TEMPERATURE: 97.3 F | OXYGEN SATURATION: 97 %

## 2024-05-13 DIAGNOSIS — M54.41 CHRONIC BILATERAL LOW BACK PAIN WITH BILATERAL SCIATICA: ICD-10-CM

## 2024-05-13 DIAGNOSIS — M54.42 CHRONIC BILATERAL LOW BACK PAIN WITH BILATERAL SCIATICA: ICD-10-CM

## 2024-05-13 DIAGNOSIS — M54.16 LUMBAR NEURITIS: Primary | ICD-10-CM

## 2024-05-13 DIAGNOSIS — M51.26 LUMBAR DISC HERNIATION: ICD-10-CM

## 2024-05-13 DIAGNOSIS — G89.29 CHRONIC BILATERAL LOW BACK PAIN WITH BILATERAL SCIATICA: ICD-10-CM

## 2024-05-13 PROCEDURE — 99214 OFFICE O/P EST MOD 30 MIN: CPT | Performed by: ANESTHESIOLOGY

## 2024-05-13 ASSESSMENT — ENCOUNTER SYMPTOMS
LOSS OF SENSATION IN FEET: 0
NAUSEA: 0
NUMBNESS: 1
CHEST TIGHTNESS: 0
SPEECH DIFFICULTY: 0
OCCASIONAL FEELINGS OF UNSTEADINESS: 0
FEVER: 0
WHEEZING: 0
EYE DISCHARGE: 0
DIAPHORESIS: 0
ARTHRALGIAS: 1
VOMITING: 0
WEAKNESS: 0
SHORTNESS OF BREATH: 0
SEIZURES: 0
NECK STIFFNESS: 0
CONSTIPATION: 0
NECK PAIN: 0
COUGH: 0
DIARRHEA: 0
BLOOD IN STOOL: 0
BACK PAIN: 1
DIZZINESS: 0
CHILLS: 0
DIFFICULTY URINATING: 0

## 2024-05-13 ASSESSMENT — PAIN SCALES - GENERAL: PAINLEVEL: 6

## 2024-05-13 NOTE — H&P (VIEW-ONLY)
History Of Present Illness  Acosta Leigh is a 44 y.o. male presenting with   Chief Complaint   Patient presents with    Follow-up        Patient returns regarding chronic low back pain to the L SIDED LOW BACK PAIN that radiates down his LLE. The pain is constant, worse with activity / sitting/twisting and better with rest. The pain is sharp, stabbing and shooting to the LLE. Denies LE saddle anesthesia, bowel or bladder incontinence. To manage this pain the patient has attempted ibuprofen with no relief of his pain.     PAIN SCORE: 2- 8 /10.    PCP: Dr. Stoll       Past Medical History  He has no past medical history on file.    Surgical History  He has a past surgical history that includes Wrist surgery (Right).     Social History  He reports that he has never smoked. He has never used smokeless tobacco. He reports current alcohol use. He reports that he does not use drugs.    Family History  Family History   Problem Relation Name Age of Onset    Colon cancer Maternal Grandfather          Allergies  Penicillin g and Penicillins    Review of Systems   Constitutional:  Negative for chills, diaphoresis and fever.   HENT:  Negative for ear discharge and tinnitus.    Eyes:  Negative for discharge.   Respiratory:  Negative for cough, chest tightness, shortness of breath and wheezing.    Cardiovascular:  Negative for chest pain.   Gastrointestinal:  Negative for blood in stool, constipation, diarrhea, nausea and vomiting.   Endocrine: Negative for polyuria.   Genitourinary:  Negative for difficulty urinating.   Musculoskeletal:  Positive for arthralgias, back pain and gait problem. Negative for neck pain and neck stiffness.   Skin:  Negative for rash.   Neurological:  Positive for numbness. Negative for dizziness, seizures, speech difficulty and weakness.       All other systems reviewed and negative for any deficits. Pertinent positives and negatives were considered in the medical decision making process.         Physical Exam    General: Pt appears stated age    Eyes: Conjunctiva non-icteric and lids without obvious rash or drooping. Pupils are symmetric    ENT: External ears and nose appear to be without deformity or rash. No lesions or masses noted. Hearing is grossly intact    Neck: No JVD noted, tracheal position midline. No goiter noted on assessment of thyroid    Respiratory: No gasping or shortness of breath noted, no use of accessory muscles noted    Cardiovascular: Extremities show no edema or varicosities    Skin: No rashes or open lesions/ulcers identified on skin. No induration/tightening noted with palpation of skin    Musculoskeletal: Gait is grossly normal    Digits/nails show no clubbing or cyanosis    Exam of muscles/joints/bones shows no gross atrophy and no abnormal/involuntary movements in the head/neckNo asymmetry or masses noted in the head/neck    Stability: no subluxation noted on movement of bilateral upper extremities or head/neck    Strength: 5/5 in RLE and 5/5 LLE     Range of Motion: WNL     Neurologic: Reflexes: 2+     Sensation: WNL    Cranial nerves 2-12 are grossly intact    Psychiatric: Pt is alert and oriented to time, place and person.       Last Recorded Vitals  /80   Pulse 51   Temp 36.3 °C (97.3 °F)   Resp 15   Wt 102 kg (224 lb)   SpO2 97%      Assessment/Plan     1. Lumbar neuritis  FL fluoro images no charge    Epidural Steroid Injection      2. Lumbar disc herniation        3. Chronic bilateral low back pain with bilateral sciatica              1. I have provided the patient with a list of physical therapy exercises to learn and perform to strengthen core, maintain stabilization, and reduce pain. We reviewed the exercises in detail and I encouraged them to perform them on a regular basis.    2. I would recommend the pt CONTINUE Gabapentin 600mg bid to help with nerve related pain. We discussed the risks, benefits, and side effects to this medication including the  mechanism of action and the pt understands and agrees.    3. I again reviewed the patients MRI findings (9-) in detail, including review of the actual images and provided a detailed explanation of the findings using a spine model.     There are degenerative changes at the L1/2 level with modic changes and a disc herniations most prominently at the L5/S1 level.     4.  The patient is a candidate for an LESI  L5/S1 low back and radicular pain. I spent time with the patient discussing all of the risks, benefits, and alternatives to this measure. Including but not limited to spinal infection, epidural hematoma/abscess, paralysis, nerve injury, steroid effects, and spinal headache. The patient understands and agrees to proceed as needed.     His last injection was in 2-9-2024 and he reported greater than 80% relief of his pain that lasted for approx 1 month. He hwas able to sit and stand with less pain, however, it has since worn off.     Isauro Kyle MD    I spent time with the patient reviewing their imaging and discussing the risks benefits and alternatives to the above plan. A total of 30 minutes was spent reviewing the data and greater than 50% of that time was with the patient during the face to face encounter discussing treatment options both surgical, non-surgical, and minimally invasive techniques.

## 2024-06-11 ENCOUNTER — HOSPITAL ENCOUNTER (OUTPATIENT)
Dept: PAIN MEDICINE | Facility: CLINIC | Age: 44
Discharge: HOME | End: 2024-06-11
Payer: COMMERCIAL

## 2024-06-11 ENCOUNTER — HOSPITAL ENCOUNTER (OUTPATIENT)
Dept: RADIOLOGY | Facility: CLINIC | Age: 44
Discharge: HOME | End: 2024-06-11
Payer: COMMERCIAL

## 2024-06-11 VITALS
WEIGHT: 220 LBS | BODY MASS INDEX: 32.58 KG/M2 | SYSTOLIC BLOOD PRESSURE: 140 MMHG | TEMPERATURE: 96.8 F | HEIGHT: 69 IN | DIASTOLIC BLOOD PRESSURE: 82 MMHG | OXYGEN SATURATION: 99 % | HEART RATE: 63 BPM | RESPIRATION RATE: 16 BRPM

## 2024-06-11 DIAGNOSIS — M54.16 LUMBAR NEURITIS: ICD-10-CM

## 2024-06-11 PROCEDURE — 2500000004 HC RX 250 GENERAL PHARMACY W/ HCPCS (ALT 636 FOR OP/ED)

## 2024-06-11 PROCEDURE — 7100000009 HC PHASE TWO TIME - INITIAL BASE CHARGE

## 2024-06-11 PROCEDURE — 2500000005 HC RX 250 GENERAL PHARMACY W/O HCPCS

## 2024-06-11 PROCEDURE — 7100000010 HC PHASE TWO TIME - EACH INCREMENTAL 1 MINUTE

## 2024-06-11 PROCEDURE — A4216 STERILE WATER/SALINE, 10 ML: HCPCS

## 2024-06-11 PROCEDURE — 62323 NJX INTERLAMINAR LMBR/SAC: CPT | Performed by: ANESTHESIOLOGY

## 2024-06-11 RX ORDER — SODIUM CHLORIDE 9 MG/ML
INJECTION, SOLUTION INTRAMUSCULAR; INTRAVENOUS; SUBCUTANEOUS
Status: COMPLETED
Start: 2024-06-11 | End: 2024-06-11

## 2024-06-11 RX ORDER — LIDOCAINE HYDROCHLORIDE 5 MG/ML
INJECTION, SOLUTION INFILTRATION; INTRAVENOUS
Status: COMPLETED
Start: 2024-06-11 | End: 2024-06-11

## 2024-06-11 RX ORDER — TRIAMCINOLONE ACETONIDE 40 MG/ML
INJECTION, SUSPENSION INTRA-ARTICULAR; INTRAMUSCULAR
Status: COMPLETED
Start: 2024-06-11 | End: 2024-06-11

## 2024-06-11 ASSESSMENT — COLUMBIA-SUICIDE SEVERITY RATING SCALE - C-SSRS
1. IN THE PAST MONTH, HAVE YOU WISHED YOU WERE DEAD OR WISHED YOU COULD GO TO SLEEP AND NOT WAKE UP?: NO
6. HAVE YOU EVER DONE ANYTHING, STARTED TO DO ANYTHING, OR PREPARED TO DO ANYTHING TO END YOUR LIFE?: NO
2. HAVE YOU ACTUALLY HAD ANY THOUGHTS OF KILLING YOURSELF?: NO
6. HAVE YOU EVER DONE ANYTHING, STARTED TO DO ANYTHING, OR PREPARED TO DO ANYTHING TO END YOUR LIFE?: NO

## 2024-06-11 ASSESSMENT — ENCOUNTER SYMPTOMS
DEPRESSION: 0
OCCASIONAL FEELINGS OF UNSTEADINESS: 1
LOSS OF SENSATION IN FEET: 1

## 2024-06-11 ASSESSMENT — PAIN - FUNCTIONAL ASSESSMENT: PAIN_FUNCTIONAL_ASSESSMENT: 0-10

## 2024-06-11 ASSESSMENT — PAIN DESCRIPTION - DESCRIPTORS: DESCRIPTORS: ACHING

## 2024-06-11 ASSESSMENT — PAIN SCALES - GENERAL
PAINLEVEL_OUTOF10: 3
PAINLEVEL_OUTOF10: 0 - NO PAIN

## 2024-07-18 ENCOUNTER — APPOINTMENT (OUTPATIENT)
Dept: RADIOLOGY | Facility: CLINIC | Age: 44
End: 2024-07-18
Payer: COMMERCIAL

## 2024-07-18 ENCOUNTER — APPOINTMENT (OUTPATIENT)
Dept: PAIN MEDICINE | Facility: CLINIC | Age: 44
End: 2024-07-18
Payer: COMMERCIAL

## 2024-09-12 ENCOUNTER — APPOINTMENT (OUTPATIENT)
Dept: PRIMARY CARE | Facility: CLINIC | Age: 44
End: 2024-09-12
Payer: COMMERCIAL

## 2024-09-12 VITALS
WEIGHT: 223.6 LBS | OXYGEN SATURATION: 99 % | BODY MASS INDEX: 33.12 KG/M2 | SYSTOLIC BLOOD PRESSURE: 128 MMHG | HEART RATE: 93 BPM | HEIGHT: 69 IN | DIASTOLIC BLOOD PRESSURE: 78 MMHG | TEMPERATURE: 97.1 F

## 2024-09-12 DIAGNOSIS — E66.09 CLASS 1 OBESITY DUE TO EXCESS CALORIES WITHOUT SERIOUS COMORBIDITY WITH BODY MASS INDEX (BMI) OF 32.0 TO 32.9 IN ADULT: ICD-10-CM

## 2024-09-12 DIAGNOSIS — E66.09 CLASS 2 OBESITY DUE TO EXCESS CALORIES WITHOUT SERIOUS COMORBIDITY WITH BODY MASS INDEX (BMI) OF 35.0 TO 35.9 IN ADULT: ICD-10-CM

## 2024-09-12 DIAGNOSIS — Z12.11 ENCOUNTER FOR SCREENING FOR MALIGNANT NEOPLASM OF COLON: Primary | ICD-10-CM

## 2024-09-12 DIAGNOSIS — B35.6 TINEA CRURIS: ICD-10-CM

## 2024-09-12 PROCEDURE — 1036F TOBACCO NON-USER: CPT | Performed by: FAMILY MEDICINE

## 2024-09-12 PROCEDURE — 99214 OFFICE O/P EST MOD 30 MIN: CPT | Performed by: FAMILY MEDICINE

## 2024-09-12 PROCEDURE — 3008F BODY MASS INDEX DOCD: CPT | Performed by: FAMILY MEDICINE

## 2024-09-12 RX ORDER — PHENTERMINE HYDROCHLORIDE 37.5 MG/1
37.5 TABLET ORAL DAILY
Qty: 30 TABLET | Refills: 0 | Status: SHIPPED | OUTPATIENT
Start: 2024-09-12

## 2024-09-12 RX ORDER — KETOCONAZOLE 20 MG/G
CREAM TOPICAL 2 TIMES DAILY
Qty: 60 G | Refills: 2 | Status: SHIPPED | OUTPATIENT
Start: 2024-09-12 | End: 2025-09-12

## 2024-09-12 ASSESSMENT — PAIN SCALES - GENERAL: PAINLEVEL: 2

## 2024-09-12 NOTE — PROGRESS NOTES
Subjective   Patient ID: Acosta Leigh is a 44 y.o. male who presents for Follow-up (Patient is present today for a follow up).  HPI  44-year-old male for follow-up on obesity.  Also would like a referral for colonoscopy.  Bilateral jock itch.  Review of Systems  Constitutional: no chills, no fever and no night sweats.   Cardiovascular: no chest pain, no intermittent leg claudication, no lower extremity edema, no palpitations and no syncope.   Respiratory: no cough, no shortness of breath during exertion, no shortness of breath at rest and no wheezing.   Integumentary: no new skin lesions and no rashes.   Neurological: no confusion, no dizziness and no headache.   Psychiatric: no anxiety, no personality change, no depression, no anhedonia, no sleep disturbances and no substance use disorders.   Endocrine: no recent weight loss.  Visit Vitals  /78 (BP Location: Left arm, Patient Position: Sitting, BP Cuff Size: Large adult)   Pulse 93   Temp 36.2 °C (97.1 °F) (Temporal)        Objective   Physical Exam  Constitutional: Alert and in no acute distress. Well developed, well nourished.   Cardiovascular: Heart rate and rhythm were normal, normal S1 and S2, no gallops, no murmurs and no pericardial rub.   Pulmonary: No respiratory distress. Clear bilateral breath sounds.   Psychiatric: Judgment and insight: Intact. Mood and affect: Normal.  Assessment/Plan   Problem List Items Addressed This Visit             ICD-10-CM    Class 1 obesity due to excess calories without serious comorbidity with body mass index (BMI) of 32.0 to 32.9 in adult E66.09, Z68.32    Encounter for screening for malignant neoplasm of colon - Primary Z12.11    Relevant Orders    Colonoscopy Screening; Average Risk Patient    Tinea cruris B35.6    Relevant Medications    ketoconazole (NIZOral) 2 % cream     Other Visit Diagnoses         Codes    Class 2 obesity due to excess calories without serious comorbidity with body mass index (BMI)  of 35.0 to 35.9 in adult     E66.09, Z68.35    Relevant Medications    phentermine (Adipex-P) 37.5 mg tablet

## 2024-09-16 DIAGNOSIS — Z12.11 COLON CANCER SCREENING: Primary | ICD-10-CM

## 2024-09-17 RX ORDER — SODIUM, POTASSIUM,MAG SULFATES 17.5-3.13G
SOLUTION, RECONSTITUTED, ORAL ORAL
Qty: 354 ML | Refills: 0 | Status: SHIPPED | OUTPATIENT
Start: 2024-09-17

## 2024-11-13 ENCOUNTER — ANESTHESIA EVENT (OUTPATIENT)
Dept: GASTROENTEROLOGY | Facility: HOSPITAL | Age: 44
End: 2024-11-13
Payer: COMMERCIAL

## 2024-11-13 ENCOUNTER — HOSPITAL ENCOUNTER (OUTPATIENT)
Dept: GASTROENTEROLOGY | Facility: HOSPITAL | Age: 44
Discharge: HOME | End: 2024-11-13
Payer: COMMERCIAL

## 2024-11-13 ENCOUNTER — ANESTHESIA (OUTPATIENT)
Dept: GASTROENTEROLOGY | Facility: HOSPITAL | Age: 44
End: 2024-11-13
Payer: COMMERCIAL

## 2024-11-13 VITALS
OXYGEN SATURATION: 99 % | TEMPERATURE: 97.2 F | SYSTOLIC BLOOD PRESSURE: 149 MMHG | DIASTOLIC BLOOD PRESSURE: 86 MMHG | WEIGHT: 222.66 LBS | RESPIRATION RATE: 16 BRPM | HEART RATE: 79 BPM | BODY MASS INDEX: 32.98 KG/M2 | HEIGHT: 69 IN

## 2024-11-13 DIAGNOSIS — Z12.11 ENCOUNTER FOR SCREENING FOR MALIGNANT NEOPLASM OF COLON: ICD-10-CM

## 2024-11-13 PROCEDURE — 45378 DIAGNOSTIC COLONOSCOPY: CPT | Performed by: STUDENT IN AN ORGANIZED HEALTH CARE EDUCATION/TRAINING PROGRAM

## 2024-11-13 PROCEDURE — 7100000009 HC PHASE TWO TIME - INITIAL BASE CHARGE

## 2024-11-13 PROCEDURE — 3700000001 HC GENERAL ANESTHESIA TIME - INITIAL BASE CHARGE

## 2024-11-13 PROCEDURE — A45378 PR COLONOSCOPY,DIAGNOSTIC: Performed by: ANESTHESIOLOGY

## 2024-11-13 PROCEDURE — 2500000004 HC RX 250 GENERAL PHARMACY W/ HCPCS (ALT 636 FOR OP/ED): Performed by: ANESTHESIOLOGIST ASSISTANT

## 2024-11-13 PROCEDURE — A45378 PR COLONOSCOPY,DIAGNOSTIC: Performed by: ANESTHESIOLOGIST ASSISTANT

## 2024-11-13 PROCEDURE — 3700000002 HC GENERAL ANESTHESIA TIME - EACH INCREMENTAL 1 MINUTE

## 2024-11-13 PROCEDURE — 2500000004 HC RX 250 GENERAL PHARMACY W/ HCPCS (ALT 636 FOR OP/ED): Performed by: ANESTHESIOLOGY

## 2024-11-13 PROCEDURE — 7100000010 HC PHASE TWO TIME - EACH INCREMENTAL 1 MINUTE

## 2024-11-13 RX ORDER — PROPOFOL 10 MG/ML
INJECTION, EMULSION INTRAVENOUS AS NEEDED
Status: DISCONTINUED | OUTPATIENT
Start: 2024-11-13 | End: 2024-11-13

## 2024-11-13 RX ORDER — ONDANSETRON HYDROCHLORIDE 2 MG/ML
4 INJECTION, SOLUTION INTRAVENOUS ONCE AS NEEDED
Status: DISCONTINUED | OUTPATIENT
Start: 2024-11-13 | End: 2024-11-14 | Stop reason: HOSPADM

## 2024-11-13 RX ORDER — LIDOCAINE HCL/PF 100 MG/5ML
SYRINGE (ML) INTRAVENOUS AS NEEDED
Status: DISCONTINUED | OUTPATIENT
Start: 2024-11-13 | End: 2024-11-13

## 2024-11-13 SDOH — HEALTH STABILITY: MENTAL HEALTH: CURRENT SMOKER: 0

## 2024-11-13 ASSESSMENT — PAIN SCALES - GENERAL
PAINLEVEL_OUTOF10: 0 - NO PAIN

## 2024-11-13 ASSESSMENT — PAIN - FUNCTIONAL ASSESSMENT: PAIN_FUNCTIONAL_ASSESSMENT: 0-10

## 2024-11-13 NOTE — DISCHARGE INSTRUCTIONS
Patient Instructions after an Endoscopy      Post-procedure recommendations:  - The patient will be observed post-procedure, until all discharge criteria are met.   - Discharge the patient to home with family member/escort.  - Resume previous diet.  - Continue present medications.  - Observe patient's clinical course following today's procedure with therapeutic intervention.   - Watch for bleeding, perforation, and infection.   - Follow-up with referring physician.   - Return to primary care physician.  - The patient has a contact number available for  emergencies.  The signs and symptoms of potential delayed complications were discussed with the patient.  Return to normal activities tomorrow.  Written discharge instructions were provided to the patient.    The anesthetics, sedatives or narcotics which were given to you today will be acting in your body for the next 24 hours, so you might feel a little sleepy or groggy.  This feeling should slowly wear off. Carefully read and follow the instructions.     You received sedation today:  - Do not drive or operate any machinery or power tools of any kind.   - No alcoholic beverages today, not even beer or wine.  - Do not make any important decisions or sign any legal documents.  - No over the counter medications that contain alcohol or that may cause drowsiness.  - Do not make any important decisions or sign any legal documents.    While it is common to experience mild to moderate abdominal distention, gas, or belching after your procedure, if any of these symptoms occur following discharge from the GI Lab or within one week of having your procedure, call the [facility] to be advised whether a visit to your nearest Urgent Care or Emergency Department is indicated.  Take this paper with you if you go:  - If you develop an allergic reaction to the medications that were given during your procedure such as difficulty breathing, rash, hives, severe nausea,  vomiting or lightheadedness.  - If you experience chest pain, shortness of breath, severe abdominal pain, fevers and chills.  - If you develop signs and symptoms of bleeding such as blood in your spit, if your stools turn black, tarry, or bloody.  - If you have not urinated within 8 hours following your procedure.  - If your IV site becomes painful, red, inflamed, or looks infected.       If you experience any problems or have any questions following discharge from the GI Lab, please call: [phone]

## 2024-11-13 NOTE — H&P
Procedure H&P    Patient Profile-Procedures  Name Acosta Leigh  Date of Birth 1980  MRN [id number]  Address   [address] [place] [id number] [place] [address]    Primary Phone Number [phone]  Secondary Phone Number    Robin Garcia    Procedure(s):  Procedures: Colonoscopy  Primary contact name and number   Extended Emergency Contact Information  Primary Emergency Contact: Carlito Leigh   [place]  Mobile Phone: [phone]  Relation: Child   needed? No  Secondary Emergency Contact: YULIA ONEILL  Mobile Phone: [phone]  Relation: Significant Other   needed? No    General Health  Weight There were no vitals filed for this visit.  BMI There is no height or weight on file to calculate BMI.    Allergies  Allergies   Allergen Reactions    Penicillin G Hives    Penicillins Hives       Past Medical History   No past medical history on file.    Provider assessment  Diagnosis: Colon Cancer Screening/Surveillance   Medication Reviewed - yes  Prior to Admission medications    Medication Sig Start Date End Date Taking? Authorizing Provider   phentermine (Adipex-P) 37.5 mg tablet Take 1 tablet (37.5 mg) by mouth once daily. 9/12/24  Yes Robin Altmanic V, DO   propranolol (Inderal) 20 mg tablet Take 1 tablet (20 mg) by mouth 3 times a day. 3/5/24 3/5/25 Yes Robin Altmanic V, DO   gabapentin (Neurontin) 600 mg tablet Take 1 tablet (600 mg) by mouth 3 times a day.  Patient not taking: Reported on 9/12/2024 1/22/24 1/21/25  Isauro Kyle MD   ketoconazole (NIZOral) 2 % cream Apply topically 2 times a day. 9/12/24 9/12/25  Robin Altmanic V, DO   sildenafil (Viagra) 50 mg tablet Take 2 tablets (100 mg) by mouth if needed for erectile dysfunction. 4/30/24 4/30/25  Robin Sustersic V, DO   sodium,potassium,mag sulfates (Suprep) 17.5-3.13-1.6 gram recon soln solution Take one bottle twice as directed by the prep instructions 9/17/24   Ashok  MD Isaac       Physical Exam  There were no vitals filed for this visit.     General: A&Ox3, NAD.  HEENT: AT/NC.   CV: RRR. No murmur.  Resp: CTA bilaterally. No wheezing, rhonchi or rales.   GI: Soft, NT/ND. BSx4.  Extrem: No edema. Pulses intact.  Neuro: No focal deficits.   Psych: Normal mood and affect.      Procedure Plan - pre-procedural (re)assesment completed by physician:  discharge/transfer patient when discharge criteria met    Ashok Gray MD  11/13/2024 10:22 AM

## 2024-11-13 NOTE — ANESTHESIA POSTPROCEDURE EVALUATION
Patient: Acosta Leigh    Procedure Summary       Date: 11/13/24 Room / Location: [facility]    Anesthesia Start: 1148 Anesthesia Stop: 1218    Procedure: COLONOSCOPY Diagnosis: Encounter for screening for malignant neoplasm of colon    Scheduled Providers: Ashok Gray MD Responsible Provider: Anival Coats MD    Anesthesia Type: MAC ASA Status: 2            Anesthesia Type: MAC    Vitals Value Taken Time   /71 11/13/24 1218   Temp 36.2 11/13/24 1218   Pulse 87 11/13/24 1218   Resp 14 11/13/24 1218   SpO2 96 11/13/24 1218       Anesthesia Post Evaluation    Patient location during evaluation: PACU  Patient participation: waiting for patient participation  Level of consciousness: obtunded/minimal responses  Pain management: adequate  Airway patency: patent  Cardiovascular status: acceptable  Respiratory status: acceptable  Hydration status: acceptable  Postoperative Nausea and Vomiting: none      No notable events documented.

## 2024-11-13 NOTE — ANESTHESIA PREPROCEDURE EVALUATION
Patient: Acosta Leigh    Procedure Information       Date/Time: 11/13/24 1130    Scheduled providers: Ashok Gray MD    Procedure: COLONOSCOPY    Location: [facility]            Relevant Problems   Pulmonary   (+) Community acquired pneumonia   (+) Pneumonia due to COVID-19 virus      Neuro   (+) Generalized anxiety disorder   (+) Situational anxiety      GI   (+) Gastroesophageal reflux disease with esophagitis without hemorrhage      Endocrine   (+) Class 1 obesity due to excess calories without serious comorbidity with body mass index (BMI) of 32.0 to 32.9 in adult   (+) Non morbid obesity due to excess calories      Musculoskeletal   (+) Chronic left-sided low back pain with left-sided sciatica   (+) Degenerative disc disease, lumbar      HEENT   (+) Acute sinusitis      ID   (+) Community acquired pneumonia   (+) Pneumonia due to COVID-19 virus   (+) Tinea cruris       Clinical information reviewed:    Allergies  Meds               NPO Detail:  No data recorded     Physical Exam    Airway  Mallampati: I  TM distance: >3 FB  Neck ROM: full     Cardiovascular - normal exam     Dental - normal exam     Pulmonary - normal exam     Abdominal   (+) obese             Anesthesia Plan    History of general anesthesia?: yes  History of complications of general anesthesia?: no    ASA 2     MAC     The patient is not a current smoker.  Patient was previously instructed to abstain from smoking on day of procedure.  Patient did not smoke on day of procedure.    intravenous induction   Anesthetic plan and risks discussed with patient.  Use of blood products discussed with patient who consented to blood products.    Plan discussed with [facility].

## 2024-11-13 NOTE — POST-PROCEDURE NOTE
Pt is tolerating PO snack well.  Reviewed discharge instructions, educated pt  on anesthesia safety. Dr Gray visiting with pt  All pre-op belongings with the pt.

## 2025-01-02 ENCOUNTER — TELEPHONE (OUTPATIENT)
Dept: PRIMARY CARE | Facility: CLINIC | Age: 45
End: 2025-01-02
Payer: COMMERCIAL

## 2025-01-02 DIAGNOSIS — B35.6 TINEA CRURIS: Primary | ICD-10-CM

## 2025-01-02 DIAGNOSIS — G47.33 OSA (OBSTRUCTIVE SLEEP APNEA): ICD-10-CM

## 2025-01-02 NOTE — TELEPHONE ENCOUNTER
Pt here in sept but would like referral for sleep apnea & also a derm for jock itch  Cream did not help  Please advise  Ty

## 2025-01-15 PROBLEM — E66.09 NON MORBID OBESITY DUE TO EXCESS CALORIES: Status: RESOLVED | Noted: 2023-06-15 | Resolved: 2025-01-15

## 2025-01-15 NOTE — PROGRESS NOTES
[facility] Sleep Medicine  [facility] [place]  [address]  [facility] [address]  [phone]     [facility] Medicine [facility]  New Visit Note    Virtual or Telephone Consent    An interactive audio and video telecommunication system which permits real time communications between the patient (at the originating site) and provider (at the distant site) was utilized to provide this telehealth service.   Verbal consent was requested and obtained from Acosta Leigh on this date, 01/22/25 for a telehealth visit.        Subjective   Patient ID: Acosta Leigh is a 44 y.o. male with past medical history significant for Obesity, Allergic Rhinitis, Gastroesophageal reflux disease, Anxiety, and Sleep disorder breathing.     1/22/2025: The patient had a virtual visit with me and was referred by PCP Robin TORRES DO  for comprehensive sleep medicine evaluation due to suspected sleep apnea, excessive daytime sleepiness/fatigue, and difficulty falling/staying asleep. He had a home sleep study, which was inclusive by  HSAT. Therefore, we discussed changing his HSAT to be done at [facility] to ease his financial burden and investigate the underlying sleep issues. His ESS is 8, and his OZZIE is 18  today.      HPI  Patient had been having these symptoms for the past 7-8  years. Patient never had sleep study done yet.       SLEEP STUDY HISTORY: (personally reviewed raw data such as interpretation report, data sheet, hypnogram, and titration table if available and applicable)  N/A    SLEEP-WAKE SCHEDULE  Bedtime: 10-11 PM on weekdays, MN on weekends  Subjective sleep latency: 10-60 minutes  Problems falling asleep: sometimes  Number of awakenings: 5 times per night spontaneously for unknown reasons (1 X BR)  Falls back asleep in 5-60 minutes  Problems staying asleep: sometimes  Final wake time: 5 -5 : 30 AM on weekdays, 7 AM on weekends  Shift  work: No  Naps: No  Average sleep duration (excluding naps): 5-6 hours    SLEEP ENVIRONMENT  Sleep location: bed  Sleep status: sleeps with girl friend  Room is dark:  Yes  Room is quiet: Yes  Room is cool: Yes  Bed comfort: good    SLEEP HABITS:   Activities before bedtime: watch TV, use cell phone  Activities in bed: no  Preferred sleep position: right side    SLEEP ROS:  Night symptoms: POSITIVE for snoring, witnessed apnea, wake up gasping and/or choking for air, nasal congestion , waking up with racing heart, and heartburn at night  Morning symptoms: POSITIVE for unrefreshing sleep, morning headache, and morning dry mouth  Daytime symptoms POSITIVE for excessive daytime sleepiness, fatigue, trouble remembering things in daytime, trouble staying focused in daytime, irritability in daytime, and drowsy driving  Hypersomnia / narcolepsy symptoms: Patient denies symptoms of a hypersomnolence disorder such as sleep paralysis, sleep-related hallucinations, and cataplexy.   RLS symptoms: Frequency of RLS symptoms: once in 2 weeks  RLS symptoms affect sleep onset: Yes   RLS symptoms wakes patient up from sleep: Yes   Movements in sleep: POSITIVE for frequent leg kicks / jerks at night while asleep at times  Parasomnia symptoms: Patient denies symptoms of parasomnia such as sleepwalking, sleeptalking, sleep-eating, acting out dreams, and nightmares.     WEIGHT: stable    REVIEW OF SYSTEMS: All other systems have been reviewed and are negative.    PERTINENT SOCIAL HISTORY:  Occupation: desk job  Smoking: No   ETOH: Yes, socially  Marijuana: No   Caffeine: Yes, 2 cups of coffee in the morning  Sleep aids: Yes, sometimes Melatonin  Claustrophobia: No     PERTINENT PAST SURGICAL HISTORY:  non-contributory    PERTINENT FAMILY HISTORY:  Patient denies family history of any sleep disorder.  Patient denies family history of sleep apnea.    Active Problems, Allergy List, Medication List, and PMH/PSH/FH/Social Hx have been  reviewed and reconciled in chart. No significant changes unless documented in the pertinent chart section. Updates made when necessary.       Objective     REVIEW OF SYSTEMS  All other systems have been reviewed and are negative.    ALLERGIES  Allergies   Allergen Reactions    Penicillin G Hives    Penicillins Hives       MEDICATIONS  Current Outpatient Medications   Medication Sig Dispense Refill    ketoconazole (NIZOral) 2 % cream Apply topically 2 times a day. 60 g 2    phentermine (Adipex-P) 37.5 mg tablet Take 1 tablet (37.5 mg) by mouth once daily. 30 tablet 0    propranolol (Inderal) 20 mg tablet Take 1 tablet (20 mg) by mouth 3 times a day. 90 tablet 11    sildenafil (Viagra) 50 mg tablet Take 2 tablets (100 mg) by mouth if needed for erectile dysfunction. 60 tablet 3     No current facility-administered medications for this visit.         Physical Exam  Constitutional:alert and oriented to time, place, and person    Assessment/Plan   Acosta Leigh is a 44 y.o. male who is seen to evaluate for possible obstructive sleep apnea. The pathophysiology of sleep apnea, diagnostic testing (HST vs PSG), treatment options (PAP, oral appliance, surgery, hypoglossal nerve stimulator called Inspire), and supportive management (weight loss, positional therapy, smoking cessation, avoidance of alcohol and sedatives) were discussed with the patient in detail. Risk factors of sleep apnea as well as cardiometabolic and neurocognitive sequelae associated with untreated sleep apnea were also discussed. Lastly, patient was advised to avoid driving vehicle or operating heavy machinery when sleepy.     Acosta Leigh with the following problems:     # SLEEP DISORDERED BREATHING:  -This is likely sleep apnea based on the the history and physical examination.   -Acosta Leigh has not yet had a sleep study.  -Instruct patient to complete a home sleep study.  -HSAT is reasonable as patient likely has JESUS based  on history and exam and does not have any of the following comorbidities: CHF, neuromuscular weakness, hypoventilation, or significant COPD.  -We consider treatment as indicated when testing is complete.     # CHRONIC SLEEP ONSET/ SLEEP MAINTENANCE INSOMNIA:  -likely due to poor sleep hygiene, and untreated sleep apnea.  -Sleep hygiene discuss in the clinic.    # BORDERLINE HYPERTENSION:  -Denies headache, palpitation, and syncope in the clinic.  -Follows with PCP/ Cardiology     # OBESITY:   -with a BMI of 32.88. Acosta Leigh most recent Bicarbonate was 28  Bicarbonate   Date Value Ref Range Status   03/29/2024 28 21 - 32 mmol/L Final   -Encourage to have regular exercise to manage weight well.    # XEROSTOMIA:  -Instruct Acosta Leighto purchase the Biotene gel to ease the dry mouth symptom,     # RESTLESS LEG SYNDROME: 1/ 2 weeks  -will reevaluate it after treatment     RTC 2-3 weeks after sleep study       All of patient's questions were answered. He verbalizes understanding and agreement with my assessment and plan.

## 2025-01-22 ENCOUNTER — OFFICE VISIT (OUTPATIENT)
Dept: SLEEP MEDICINE | Facility: HOSPITAL | Age: 45
End: 2025-01-22
Payer: COMMERCIAL

## 2025-01-22 DIAGNOSIS — E66.09 CLASS 1 OBESITY DUE TO EXCESS CALORIES WITHOUT SERIOUS COMORBIDITY WITH BODY MASS INDEX (BMI) OF 32.0 TO 32.9 IN ADULT: ICD-10-CM

## 2025-01-22 DIAGNOSIS — G47.33 OSA (OBSTRUCTIVE SLEEP APNEA): ICD-10-CM

## 2025-01-22 DIAGNOSIS — G47.30 SLEEP DISORDER BREATHING: Primary | ICD-10-CM

## 2025-01-22 DIAGNOSIS — F41.8 SITUATIONAL ANXIETY: ICD-10-CM

## 2025-01-22 DIAGNOSIS — K21.00 GASTROESOPHAGEAL REFLUX DISEASE WITH ESOPHAGITIS WITHOUT HEMORRHAGE: ICD-10-CM

## 2025-01-22 DIAGNOSIS — R03.0 BORDERLINE HYPERTENSION: ICD-10-CM

## 2025-01-22 DIAGNOSIS — E66.811 CLASS 1 OBESITY DUE TO EXCESS CALORIES WITHOUT SERIOUS COMORBIDITY WITH BODY MASS INDEX (BMI) OF 32.0 TO 32.9 IN ADULT: ICD-10-CM

## 2025-01-22 PROCEDURE — 99204 OFFICE O/P NEW MOD 45 MIN: CPT

## 2025-01-22 PROCEDURE — 99214 OFFICE O/P EST MOD 30 MIN: CPT | Mod: 95

## 2025-01-22 PROCEDURE — 1036F TOBACCO NON-USER: CPT

## 2025-01-22 ASSESSMENT — SLEEP AND FATIGUE QUESTIONNAIRES
HOW LIKELY ARE YOU TO NOD OFF OR FALL ASLEEP WHILE WATCHING TV: MODERATE CHANCE OF DOZING
HOW LIKELY ARE YOU TO NOD OFF OR FALL ASLEEP WHILE SITTING AND TALKING TO SOMEONE: WOULD NEVER DOZE
DIFFICULTY_STAYING_ASLEEP: MODERATE
DIFFICULTY_FALLING_ASLEEP: MODERATE
ESS-CHAD TOTAL SCORE: 8
WAKING_TOO_EARLY: MODERATE
HOW LIKELY ARE YOU TO NOD OFF OR FALL ASLEEP WHILE LYING DOWN TO REST IN THE AFTERNOON WHEN CIRCUMSTANCES PERMIT: MODERATE CHANCE OF DOZING
SLEEP_PROBLEM_NOTICEABLE_TO_OTHERS: SOMEWHAT
SATISFACTION_WITH_CURRENT_SLEEP_PATTERN: VERY DISSATISFIED
SLEEP_PROBLEM_INTERFERES_DAILY_ACTIVITIES: MUCH
WORRIED_DISTRESSED_DUE_TO_SLEEP: MUCH
HOW LIKELY ARE YOU TO NOD OFF OR FALL ASLEEP WHEN YOU ARE A PASSENGER IN A CAR FOR AN HOUR WITHOUT A BREAK: MODERATE CHANCE OF DOZING
SITING INACTIVE IN A PUBLIC PLACE LIKE A CLASS ROOM OR A MOVIE THEATER: WOULD NEVER DOZE
HOW LIKELY ARE YOU TO NOD OFF OR FALL ASLEEP WHILE SITTING QUIETLY AFTER LUNCH WITHOUT ALCOHOL: SLIGHT CHANCE OF DOZING
HOW LIKELY ARE YOU TO NOD OFF OR FALL ASLEEP WHILE SITTING AND READING: SLIGHT CHANCE OF DOZING
HOW LIKELY ARE YOU TO NOD OFF OR FALL ASLEEP IN A CAR, WHILE STOPPED FOR A FEW MINUTES IN TRAFFIC: WOULD NEVER DOZE

## 2025-01-22 NOTE — PATIENT INSTRUCTIONS
"       [facility]  [facility]  [address]  [facility] [address] [phone]       Thank you for coming to the Sleep Medicine Clinic today! Your sleep medicine provider today was: ASIA Garcia Below is a summary of your treatment plan, patient education, other important information, and our contact numbers.    Dear Mr. Acosta Leigh       Your Sleep Provider Today: ASIA Garcia  Your Primary Care Physician: Robin Stoll,    Your Referring Provider: Robin Stoll,     Thank you for visiting  Sleep Medicine Clinic !     1. We will proceed with a HOME sleep study to check your risk of sleep apnea. The lab will call you and schedule it for you.     2. Please do not drive when you are sleepy and start practicing the sleep hygiene as discussed in clinic.    3. Please start using Biotene to ease your dry mouth if needed.    4. FOR QUESTIONS AND CONCERNS:   Please call my office with issues or questions: [phone] (Rhoda); [phone] (Priya); [phone] ([place])      In the event that you are running more than 10 minutes late to your appointment, I will kindly ask you to reschedule. Thanks.      TREATMENT PLAN     - Do the following testing: home sleep apnea test  - Please read the \"Patient Education\" section below for more detailed information. Try implementing tips, reminders, strategies, and supportive management.   - If not yet done, please sign up for [employer] to make a future schedule, send prescription requests, or send messages.    Follow-up Appointment:   Follow-up in 2-3 weeks after sleep study.    PATIENT EDUCATION     OBSTRUCTIVE SLEEP APNEA (JESUS) is a sleep disorder where your upper airway muscles relax during sleep and the airway intermittently and repetitively narrows and collapses leading to partially blocked airway (hypopnea) or completely blocked airway (apnea) which, in " turn, can disrupt breathing in sleep, lower oxygen levels while you sleep and cause night time wakings. Because both apnea and hypopnea may cause higher carbon dioxide or low oxygen levels, untreated JESUS can lead to heart arrhythmia, elevation of blood pressure, and make it harder for the body to consolidate memory and facilitate metabolism (leading to higher blood sugars at night). Frequent partial arousals occur during sleep resulting in sleep deprivation and daytime sleepiness. JESUS is associated with an increased risk of cardiovascular disease, stroke, hypertension, and insulin resistance. Moreover, untreated JESUS with excessive daytime sleepiness can increase the risk of motor vehicular accidents.    Below are conservative strategies for JESUS regardless of JESUS severity are:   Positional therapy - Avoid sleeping on your back.   Healthy diet and regular exercise to optimize weight is highly encouraged.   Avoid alcohol late in the evening and sedative-hypnotics as these substances can make sleep apnea worse.   Improve breathing through the nose with intranasal steroid spray, saline rinse, or antihistamines    Safety: Avoid driving vehicle and operating heavy equipment while sleepy. Drowsy driving may lead to life-threatening motor vehicle accidents. A person driving while sleepy is 5 times more likely to have an accident. If you feel sleepy, pull over and take a short power nap (sleep for less than 30 minutes). Otherwise, ask somebody to drive you.    Treatment options for sleep apnea include weight management, positional therapy, Positive Airway Therapy (PAP) therapy, oral appliance therapy, hypoglossal nerve stimulator (Inspire) and select airway surgeries.    Sleep Testing for sleep apnea: The best and ideal way to check out if you have sleep apnea is to do an overnight sleep study in the sleep laboratory. Alternatively, a home sleep apnea test can also be done depending on your insurance and risk factors.     If  you are having a home sleep apnea test, kindly allot 1 hour during pickup of the testing kit as you will have to complete paperwork and listen to the sleep technician for in-person on-the-spot demonstration and instructions on how to hook up the testing kit at home. Do the test for 1 day and start off with sleeping on your back. If you sleep on your side in the middle of night or you have always been a side or stomach-sleeper, it is ok as long as you have some time on your back and off-back.     If you are having an overnight in-lab sleep study, please make sure to bring toiletries, a comfy pillow, additional warm blankets, and any nighttime medications (include as-needed inhaler, pain pill, etc) that you may regularly take. Also, be sure to eat dinner before you arrive as we generally do not provide meals inside the sleep testing center. Lastly, in order to fall asleep faster in the sleep testing center, we advise patients to wake up 2 hours earlier on the morning of scheduled testing and avoid napping 2 days prior testing. Sometimes, your sleep provider may prescribe a sleep aid to be taken at lights out in the sleep testing center. If you are taking a sleep aid, consider having somebody pick you up after the sleep testing.    Overnight sleep studies may be scheduled on a weekday or weekend. We also perform daytime testing for shift workers on a case-by-case basis.    Once you have booked an appointment to do the sleep study, please contact my office for follow-up visit to discuss results.    On the other hand, if you have any of the following, please consider calling the sleep testing center to RESCHEDULE your sleep study appointment:  If you tested positive for COVID within 10 days of your sleep study appointment.  If you were exposed to somebody who was confirmed for COVID within 10 days of your sleep study appointment and now you are having symptoms of possible COVID  If you have fever>100F or any acute  symptoms that you think will lead to poor sleep during testing (e.g. new or worsening stuffy nose not relieved by steroid nasal spray)  If you have traveled domestically or internationally in the last month and now you are having symptoms of possible COVID    You can also go to the following EDUCATION WEBSITES for further information:   American Academy of Sleep Medicine http://sleepeducation.org  National Sleep Foundation: https://sleepfoundation.org  American Sleep Apnea Association: https://www.sleepapnea.org (for patients with sleep apnea)  Narcolepsy Network: https://www.narcolepsynetwork.org (for patients with narcolepsy)  [employer]: https://www.wakeupnarcolepsy.org (for patients with narcolepsy)  Hypersomnia Foundation: https://www.hypersomniafoundation.org (for patients with idiopathic hypersomnia)  RLS foundation: https://www.rls.org (for patients with restless leg syndrome)    IMPORTANT INFORMATION     Call 911 for medical emergencies.  Our offices are generally open from Monday-Friday, 8 am - 5 pm.   There are no supporting services by either the sleep doctors or their staff on weekends and Holidays, or after 5 PM on weekdays.   If you need to get in touch with me, you may either call my office number or you can use [employer].  If a referral for a test, for CPAP, or for another specialist was made, and you have not heard about scheduling this within a week, please call scheduling at [phone] ([phone]).  If you are unable to make your appointment for clinic or an overnight study, kindly call the office or sleep testing center at least 48 hours in advance to cancel and reschedule.  If you are on CPAP, please bring your device's card and/or the device to each clinic appointment.   In case of problems with PAP machine or mask interface, please contact your [employer] (Durable Medical Equipment) company first. [employer] is the company who provides you the machine and/or PAP supplies.       PRESCRIPTIONS     We  require 7 days advanced notice for prescription refills. If we do not receive the request in this time, we cannot guarantee that your medication will be refilled in time.    IMPORTANT PHONE NUMBERS     Sleep Medicine Clinic Fax: [phone]  Appointments (for Pediatric Sleep Clinic): [phone] - option 1  Appointments (for Adult Sleep Clinic): [phone] - option 2  Appointments (For Sleep Studies): [phone] - option 3  Behavioral Sleep Medicine: [phone]  Sleep Surgery: [phone]  Nutrition Service: [phone]  Weight management clinics with endocrinology: [phone]  Bariatric Services: [phone] (includes weight loss medications and weight loss surgery)  [facility]: [phone] (offers holistic approaches to weight management)  ENT (Otolaryngology): [phone]  Headache Clinic (Neurology): [phone]  Neurology: [phone]  Psychiatry: [phone]  Pulmonary Function Testing (PFT) Center: [phone]  Pulmonary Medicine: [phone]  Prior Knowledge (eDealya): [phone]      OUR SLEEP TESTING LOCATIONS     Our team will contact you to schedule your sleep study, however, you can contact us as follow:  Main Phone Line (scheduling only): [phone]), option 3  Adult and Pediatric Locations  [facility] (6 years and older): [facility] - 4th floor ([address] [place] [address]) After hours line: [phone]  [facility] (Main campus: All ages): [facility], 6th floor. After hours line: [phone]   [place] (5 years and older; younger considered on case-by-case basis): [address]; [address], [address]. Scheduling  After hours line: [phone]   [place] (6 years and older): [address]; [address]; [address]   [place] (6 years and older): [address] [facility], [address]  After hours line: [phone]  [facility] (13 years and  "older) in [place]: [address], 2nd floor  After hours line: [phone]   [place] (13 year and older): [address]  After hours line: [phone]     Adult Only Locations:   [place] (18 years and older): [address] [facility], 2nd floor   [place] (18 years and older): [address] [facility]; 4th floor  After hours line: [phone]  [facility] (18 years and older) at [place]: [address] [facility]  After hours line: [phone]     CONTACTING YOUR SLEEP MEDICINE PROVIDER AND SLEEP TEAM      For issues with your machine or mask interface, please call your DME provider first. DME stands for durable medical company. DME is the company who provides you the machine and/or PAP supplies / accessories.   To schedule, cancel, or reschedule SLEEP STUDY APPOINTMENTS, please call the Main Phone Line at [phone] - option 3.   To schedule, cancel, or reschedule CLINIC APPOINTMENTS, you can do it in \"[employer]\", call [phone] to speak with my  (Michelle Gutierrez), or call the Main Phone Line at [phone] - option 2  For CLINICAL QUESTIONS or MEDICATION REFILLS, please call direct line for Adult Sleep Nurses at [phone].   Lastly, you can also send a message directly to your provider through \"My Chart\", which is the email service through your  Records Account: [email or web address]       Here at [facility], we wish you a restful sleep!   "

## 2025-02-10 ENCOUNTER — OFFICE VISIT (OUTPATIENT)
Dept: PRIMARY CARE | Facility: CLINIC | Age: 45
End: 2025-02-10
Payer: COMMERCIAL

## 2025-02-10 VITALS
WEIGHT: 234 LBS | SYSTOLIC BLOOD PRESSURE: 120 MMHG | HEART RATE: 96 BPM | HEIGHT: 69 IN | TEMPERATURE: 97.1 F | DIASTOLIC BLOOD PRESSURE: 76 MMHG | BODY MASS INDEX: 34.66 KG/M2 | OXYGEN SATURATION: 97 %

## 2025-02-10 DIAGNOSIS — E66.09 CLASS 2 OBESITY DUE TO EXCESS CALORIES WITHOUT SERIOUS COMORBIDITY WITH BODY MASS INDEX (BMI) OF 35.0 TO 35.9 IN ADULT: Primary | ICD-10-CM

## 2025-02-10 DIAGNOSIS — E66.812 CLASS 2 OBESITY DUE TO EXCESS CALORIES WITHOUT SERIOUS COMORBIDITY WITH BODY MASS INDEX (BMI) OF 35.0 TO 35.9 IN ADULT: Primary | ICD-10-CM

## 2025-02-10 DIAGNOSIS — N52.9 VASCULOGENIC ERECTILE DYSFUNCTION, UNSPECIFIED VASCULOGENIC ERECTILE DYSFUNCTION TYPE: ICD-10-CM

## 2025-02-10 PROCEDURE — 99214 OFFICE O/P EST MOD 30 MIN: CPT | Performed by: FAMILY MEDICINE

## 2025-02-10 PROCEDURE — 3008F BODY MASS INDEX DOCD: CPT | Performed by: FAMILY MEDICINE

## 2025-02-10 PROCEDURE — 1036F TOBACCO NON-USER: CPT | Performed by: FAMILY MEDICINE

## 2025-02-10 RX ORDER — PHENTERMINE HYDROCHLORIDE 37.5 MG/1
37.5 TABLET ORAL DAILY
Qty: 30 TABLET | Refills: 0 | Status: SHIPPED | OUTPATIENT
Start: 2025-02-10

## 2025-02-10 RX ORDER — SILDENAFIL 50 MG/1
100 TABLET, FILM COATED ORAL AS NEEDED
Qty: 60 TABLET | Refills: 3 | Status: SHIPPED | OUTPATIENT
Start: 2025-02-10 | End: 2026-02-10

## 2025-02-10 ASSESSMENT — PATIENT HEALTH QUESTIONNAIRE - PHQ9
SUM OF ALL RESPONSES TO PHQ9 QUESTIONS 1 AND 2: 0
2. FEELING DOWN, DEPRESSED OR HOPELESS: NOT AT ALL
1. LITTLE INTEREST OR PLEASURE IN DOING THINGS: NOT AT ALL

## 2025-02-10 ASSESSMENT — PAIN SCALES - GENERAL: PAINLEVEL_OUTOF10: 0-NO PAIN

## 2025-02-10 NOTE — PROGRESS NOTES
Subjective   Patient ID: Acosta Leigh is a 44 y.o. male who presents for Med Refill (Patient is here for med refill. ).  HPI  44 male for refill on medication for erectile dysfunction, anxiety.  Also medication for weight loss.  He is undergoing a another sleep study for evaluation for obstructive sleep apnea.  Phentermine has helped him lose weight in the past and help with his sleep apnea symptoms.  Overall improved functioning with medication.  No side effects.  Review of Systems  Constitutional: no chills, no fever and no night sweats.   Cardiovascular: no chest pain, no intermittent leg claudication, no lower extremity edema, no palpitations and no syncope.   Respiratory: no cough, no shortness of breath during exertion, no shortness of breath at rest and no wheezing.   Integumentary: no new skin lesions and no rashes.   Neurological: no confusion, no dizziness and no headache.   Psychiatric: no anxiety, no personality change, no depression, no anhedonia, no sleep disturbances and no substance use disorders.   Endocrine: no recent weight loss.  Visit Vitals  /76 (BP Location: Left arm, Patient Position: Sitting, BP Cuff Size: Adult)   Pulse 96   Temp 36.2 °C (97.1 °F) (Temporal)        Objective   Physical Exam  Constitutional: Alert and in no acute distress. Well developed, well nourished.   Cardiovascular: Heart rate and rhythm were normal, normal S1 and S2, no gallops, no murmurs and no pericardial rub.   Pulmonary: No respiratory distress. Clear bilateral breath sounds.   Psychiatric: Judgment and insight: Intact. Mood and affect: Normal.  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    Relevant Medications    sildenafil (Viagra) 50 mg tablet    Class 2 obesity due to excess calories without serious comorbidity with body mass index (BMI) of 35.0 to 35.9 in adult - Primary E66.812, E66.09, Z68.35    Relevant  Medications    phentermine (Adipex-P) 37.5 mg tablet

## 2025-02-27 ENCOUNTER — DOCUMENTATION (OUTPATIENT)
Dept: SLEEP MEDICINE | Facility: CLINIC | Age: 45
End: 2025-02-27
Payer: COMMERCIAL

## 2025-02-27 DIAGNOSIS — G47.33 OSA (OBSTRUCTIVE SLEEP APNEA): Primary | ICD-10-CM

## 2025-02-27 DIAGNOSIS — G47.33 OBSTRUCTIVE SLEEP APNEA (ADULT) (PEDIATRIC): ICD-10-CM

## 2025-02-27 NOTE — PROGRESS NOTES
The patient  Home Sleep Study result back from the [facility] revealed: BMI: 34.7, AHI 3%: 14.2/hr, AHI 4%: 7.5/hr, Jeffry: 74%, <88%: 2 minutes. I spoke with the patient and will start the treatment for him.  The  desensitization strategy, 30-day free mask return policy, and insurance requirements are all discussed with the patient. The patient verbalized understanding it.

## 2025-03-10 ENCOUNTER — APPOINTMENT (OUTPATIENT)
Dept: PRIMARY CARE | Facility: CLINIC | Age: 45
End: 2025-03-10
Payer: COMMERCIAL

## 2025-03-10 VITALS
OXYGEN SATURATION: 95 % | BODY MASS INDEX: 33.18 KG/M2 | HEIGHT: 69 IN | DIASTOLIC BLOOD PRESSURE: 86 MMHG | WEIGHT: 224 LBS | HEART RATE: 66 BPM | SYSTOLIC BLOOD PRESSURE: 127 MMHG

## 2025-03-10 DIAGNOSIS — F41.8 SITUATIONAL ANXIETY: ICD-10-CM

## 2025-03-10 DIAGNOSIS — E66.812 CLASS 2 OBESITY DUE TO EXCESS CALORIES WITHOUT SERIOUS COMORBIDITY WITH BODY MASS INDEX (BMI) OF 35.0 TO 35.9 IN ADULT: ICD-10-CM

## 2025-03-10 DIAGNOSIS — J45.20 MILD INTERMITTENT REACTIVE AIRWAY DISEASE WITHOUT COMPLICATION (HHS-HCC): Primary | ICD-10-CM

## 2025-03-10 DIAGNOSIS — G47.33 OSA (OBSTRUCTIVE SLEEP APNEA): ICD-10-CM

## 2025-03-10 DIAGNOSIS — E66.09 CLASS 2 OBESITY DUE TO EXCESS CALORIES WITHOUT SERIOUS COMORBIDITY WITH BODY MASS INDEX (BMI) OF 35.0 TO 35.9 IN ADULT: ICD-10-CM

## 2025-03-10 PROCEDURE — 1036F TOBACCO NON-USER: CPT | Performed by: FAMILY MEDICINE

## 2025-03-10 PROCEDURE — 99214 OFFICE O/P EST MOD 30 MIN: CPT | Performed by: FAMILY MEDICINE

## 2025-03-10 PROCEDURE — 3008F BODY MASS INDEX DOCD: CPT | Performed by: FAMILY MEDICINE

## 2025-03-10 RX ORDER — PROPRANOLOL HYDROCHLORIDE 20 MG/1
20 TABLET ORAL 3 TIMES DAILY
Qty: 90 TABLET | Refills: 11 | Status: SHIPPED | OUTPATIENT
Start: 2025-03-10 | End: 2026-03-10

## 2025-03-10 RX ORDER — PHENTERMINE HYDROCHLORIDE 37.5 MG/1
37.5 TABLET ORAL DAILY
Qty: 30 TABLET | Refills: 0 | Status: SHIPPED | OUTPATIENT
Start: 2025-03-10

## 2025-03-10 ASSESSMENT — PATIENT HEALTH QUESTIONNAIRE - PHQ9
2. FEELING DOWN, DEPRESSED OR HOPELESS: NOT AT ALL
1. LITTLE INTEREST OR PLEASURE IN DOING THINGS: NOT AT ALL
SUM OF ALL RESPONSES TO PHQ9 QUESTIONS 1 AND 2: 0

## 2025-03-10 ASSESSMENT — PAIN SCALES - GENERAL: PAINLEVEL_OUTOF10: 0-NO PAIN

## 2025-03-10 ASSESSMENT — ENCOUNTER SYMPTOMS: DEPRESSION: 0

## 2025-03-10 NOTE — PROGRESS NOTES
Subjective   Patient ID: Acosta Leigh is a 44 y.o. male who presents for Med Refill.  HPI  44-year-old male for medication refills for weight loss.  Medication working well.  No side effects.  Overall poor functioning on medication.  Review of Systems  Constitutional: no chills, no fever and no night sweats.   Cardiovascular: no chest pain, no intermittent leg claudication, no lower extremity edema, no palpitations and no syncope.   Respiratory: no cough, no shortness of breath during exertion, no shortness of breath at rest and no wheezing.   Integumentary: no new skin lesions and no rashes.   Neurological: no confusion, no dizziness and no headache.   Psychiatric: no anxiety, no personality change, no depression, no anhedonia, no sleep disturbances and no substance use disorders.   Endocrine: no recent weight loss.  Visit Vitals  /86 (BP Location: Left arm, Patient Position: Sitting)   Pulse 66        Objective   Physical Exam  Constitutional: Alert and in no acute distress. Well developed, well nourished.   Cardiovascular: Heart rate and rhythm were normal, normal S1 and S2, no gallops, no murmurs and no pericardial rub.   Pulmonary: No respiratory distress. Clear bilateral breath sounds.   Psychiatric: Judgment and insight: Intact. Mood and affect: Normal.  Assessment/Plan   Problem List Items Addressed This Visit             ICD-10-CM    Situational anxiety F41.8    Relevant Medications    propranolol (Inderal) 20 mg tablet    Class 2 obesity due to excess calories without serious comorbidity with body mass index (BMI) of 35.0 to 35.9 in adult E66.812, E66.09, Z68.35    Relevant Medications    phentermine (Adipex-P) 37.5 mg tablet    RAD (reactive airway disease) ([facility]) - Primary J45.909    JESUS (obstructive sleep apnea) G47.33

## 2025-04-10 ENCOUNTER — APPOINTMENT (OUTPATIENT)
Dept: PRIMARY CARE | Facility: CLINIC | Age: 45
End: 2025-04-10
Payer: COMMERCIAL

## 2025-04-28 ENCOUNTER — OFFICE VISIT (OUTPATIENT)
Dept: PAIN MEDICINE | Facility: CLINIC | Age: 45
End: 2025-04-28
Payer: COMMERCIAL

## 2025-04-28 VITALS
TEMPERATURE: 98.2 F | BODY MASS INDEX: 33.18 KG/M2 | SYSTOLIC BLOOD PRESSURE: 141 MMHG | HEIGHT: 69 IN | DIASTOLIC BLOOD PRESSURE: 79 MMHG | WEIGHT: 224 LBS | OXYGEN SATURATION: 97 % | RESPIRATION RATE: 15 BRPM | HEART RATE: 65 BPM

## 2025-04-28 DIAGNOSIS — M51.362 DEGENERATION OF INTERVERTEBRAL DISC OF LUMBAR REGION WITH DISCOGENIC BACK PAIN AND LOWER EXTREMITY PAIN: ICD-10-CM

## 2025-04-28 DIAGNOSIS — M54.16 LUMBAR NEURITIS: Primary | ICD-10-CM

## 2025-04-28 DIAGNOSIS — G89.29 CHRONIC BILATERAL LOW BACK PAIN WITH BILATERAL SCIATICA: ICD-10-CM

## 2025-04-28 DIAGNOSIS — M54.42 CHRONIC BILATERAL LOW BACK PAIN WITH BILATERAL SCIATICA: ICD-10-CM

## 2025-04-28 DIAGNOSIS — M54.41 CHRONIC BILATERAL LOW BACK PAIN WITH BILATERAL SCIATICA: ICD-10-CM

## 2025-04-28 PROCEDURE — 99214 OFFICE O/P EST MOD 30 MIN: CPT | Performed by: ANESTHESIOLOGY

## 2025-04-28 ASSESSMENT — ENCOUNTER SYMPTOMS
BLOOD IN STOOL: 0
CHILLS: 0
SHORTNESS OF BREATH: 0
NECK STIFFNESS: 0
FEVER: 0
WHEEZING: 0
DIARRHEA: 0
NUMBNESS: 1
CHEST TIGHTNESS: 0
CONSTIPATION: 0
NAUSEA: 0
ARTHRALGIAS: 1
BACK PAIN: 1
DIFFICULTY URINATING: 0
SPEECH DIFFICULTY: 0
NECK PAIN: 0
DIZZINESS: 0
OCCASIONAL FEELINGS OF UNSTEADINESS: 0
DIAPHORESIS: 0
VOMITING: 0
LOSS OF SENSATION IN FEET: 0
EYE DISCHARGE: 0
WEAKNESS: 0
SEIZURES: 0
COUGH: 0

## 2025-04-28 ASSESSMENT — PAIN SCALES - GENERAL
PAINLEVEL_OUTOF10: 3
PAINLEVEL_OUTOF10: 3

## 2025-04-28 ASSESSMENT — COLUMBIA-SUICIDE SEVERITY RATING SCALE - C-SSRS
2. HAVE YOU ACTUALLY HAD ANY THOUGHTS OF KILLING YOURSELF?: NO
6. HAVE YOU EVER DONE ANYTHING, STARTED TO DO ANYTHING, OR PREPARED TO DO ANYTHING TO END YOUR LIFE?: NO
1. IN THE PAST MONTH, HAVE YOU WISHED YOU WERE DEAD OR WISHED YOU COULD GO TO SLEEP AND NOT WAKE UP?: NO

## 2025-04-28 ASSESSMENT — PAIN - FUNCTIONAL ASSESSMENT: PAIN_FUNCTIONAL_ASSESSMENT: 0-10

## 2025-04-28 ASSESSMENT — PAIN DESCRIPTION - DESCRIPTORS: DESCRIPTORS: ACHING

## 2025-04-28 NOTE — PROGRESS NOTES
History Of Present Illness  Acosta Leigh is a 45 y.o. male presenting with   Chief Complaint   Patient presents with    Follow-up        Patient follows up after almost 1 year regarding chronic low back pain and left knee pain that is buckling.  The pain is constant, worse with activity / sitting/twisting and better with rest. He had a severe flare up of his pain approx 2 weeks ago that was a 10/10 and has since slowly worn down. The pain is sharp, stabbing and shooting to the LLE. Denies LE saddle anesthesia, bowel or bladder incontinence. To manage this pain the patient has attempted ibuprofen with no relief of his pain.     PAIN SCORE: 3- 10 /10.    PCP: Dr. Stoll       Past Medical History  He has no past medical history on file.    Surgical History  He has a past surgical history that includes Wrist surgery (Right).     Social History  He reports that he has never smoked. He has never used smokeless tobacco. He reports current alcohol use. He reports that he does not use drugs.    Family History  Family History   Problem Relation Name Age of Onset    Colon cancer Maternal Grandfather          Allergies  Penicillin g and Penicillins    Review of Systems   Constitutional:  Negative for chills, diaphoresis and fever.   HENT:  Negative for ear discharge and tinnitus.    Eyes:  Negative for discharge.   Respiratory:  Negative for cough, chest tightness, shortness of breath and wheezing.    Cardiovascular:  Negative for chest pain.   Gastrointestinal:  Negative for blood in stool, constipation, diarrhea, nausea and vomiting.   Endocrine: Negative for polyuria.   Genitourinary:  Negative for difficulty urinating.   Musculoskeletal:  Positive for arthralgias, back pain and gait problem. Negative for neck pain and neck stiffness.   Skin:  Negative for rash.   Neurological:  Positive for numbness. Negative for dizziness, seizures, speech difficulty and weakness.       All other systems reviewed and negative  for any deficits. Pertinent positives and negatives were considered in the medical decision making process.        Physical Exam    General: Pt appears stated age    Eyes: Conjunctiva non-icteric and lids without obvious rash or drooping. Pupils are symmetric    ENT: External ears and nose appear to be without deformity or rash. No lesions or masses noted. Hearing is grossly intact    Neck: No JVD noted, tracheal position midline. No goiter noted on assessment of thyroid    Respiratory: No gasping or shortness of breath noted, no use of accessory muscles noted    Cardiovascular: Extremities show no edema or varicosities    Skin: No rashes or open lesions/ulcers identified on skin. No induration/tightening noted with palpation of skin    Musculoskeletal: Gait is grossly normal    Digits/nails show no clubbing or cyanosis    Exam of muscles/joints/bones shows no gross atrophy and no abnormal/involuntary movements in the head/neckNo asymmetry or masses noted in the head/neck    Stability: no subluxation noted on movement of bilateral upper extremities or head/neck    Strength: 5/5 in RLE and 5/5 LLE     Range of Motion: WNL     Neurologic: Reflexes: 2+     Sensation: WNL    Cranial nerves 2-12 are grossly intact    Psychiatric: Pt is alert and oriented to time, place and person.       Last Recorded Vitals  /79   Pulse 65   Temp 36.8 °C (98.2 °F)   Resp 15   Wt 102 kg (224 lb)   SpO2 97%      Assessment/Plan     1. Degeneration of intervertebral disc of lumbar region with discogenic back pain and lower extremity pain        2. Lumbar neuritis        3. Chronic bilateral low back pain with bilateral sciatica                1. I have provided the patient with a list of physical therapy exercises to learn and perform to strengthen core, maintain stabilization, and reduce pain. We reviewed the exercises in detail and I encouraged them to perform them on a regular basis.    2. The pt was on Gabapentin 600mg bid to  help with nerve related pain. He reports this medication was not effective and discontinued.    3. I again reviewed the patients MRI findings (9-) in detail, including review of the actual images and provided a detailed explanation of the findings using a spine model.     There are degenerative changes at the L1/2 level with modic changes and a disc herniations most prominently at the L5/S1 level.     4.  The patient is a candidate for an LESI  L5/S1 low back and radicular pain. I spent time with the patient discussing all of the risks, benefits, and alternatives to this measure. Including but not limited to spinal infection, epidural hematoma/abscess, paralysis, nerve injury, steroid effects, and spinal headache. The patient understands and agrees to proceed as needed.     His last injection was in 6-2024, 2-9-2024 and he reported greater than 80% relief of his pain that lasted for approx 1 month. He hwas able to sit and stand with less pain, however, it has since worn off.     Isauro Kyle MD    I spent time with the patient reviewing their imaging and discussing the risks benefits and alternatives to the above plan. A total of 30 minutes was spent reviewing the data and greater than 50% of that time was with the patient during the face to face encounter discussing treatment options both surgical, non-surgical, and minimally invasive techniques.

## 2025-06-01 NOTE — PROGRESS NOTES
[facility]  [address]  [facility]  [address]  [facility] [address]     [facility]  Follow Up Visit Note      Subjective   Patient ID: Acosta Leigh is a 45 y.o. male with past medical history significant for ****.     6/17/2025: The patient is here {pxarrival:51747} and presents for follow-up {EWfollow-up reasons:24643}. His ESS is   today     Obesity, Allergic Rhinitis, Gastroesophageal reflux disease, Anxiety, and Sleep disorder breathing.      1/22/2025: The patient had a virtual visit with me and was referred by PCP Robin TORRES DO  for comprehensive sleep medicine evaluation due to suspected sleep apnea, excessive daytime sleepiness/fatigue, and difficulty falling/staying asleep. He had a home sleep study, which was inclusive by  HSAT. Therefore, we discussed changing his HSAT to be done at [facility] to ease his financial burden and investigate the underlying sleep issues. His ESS is 8, and his OZZIE is 18  today.        HPI  Patient had been having these symptoms for the past 7-8  years. Patient never had sleep study done yet.         SLEEP STUDY HISTORY: (personally reviewed raw data such as interpretation report, data sheet, hypnogram, and titration table if available and applicable)  N/A     SLEEP-WAKE SCHEDULE  Bedtime: 10-11 PM on weekdays, MN on weekends  Subjective sleep latency: 10-60 minutes  Problems falling asleep: sometimes  Number of awakenings: 5 times per night spontaneously for unknown reasons (1 X BR)  Falls back asleep in 5-60 minutes  Problems staying asleep: sometimes  Final wake time: 5 -5 : 30 AM on weekdays, 7 AM on weekends  Shift work: No  Naps: No  Average sleep duration (excluding naps): 5-6 hours     SLEEP ENVIRONMENT  Sleep location: bed  Sleep status: sleeps with girl friend  Room is dark:  Yes  Room is quiet: Yes  Room is cool: Yes  Bed comfort: good      SLEEP HABITS:   Activities before bedtime: watch TV, use cell phone  Activities in bed: no  Preferred sleep position: right side     SLEEP ROS:  Night symptoms: POSITIVE for snoring, witnessed apnea, wake up gasping and/or choking for air, nasal congestion , waking up with racing heart, and heartburn at night  Morning symptoms: POSITIVE for unrefreshing sleep, morning headache, and morning dry mouth  Daytime symptoms POSITIVE for excessive daytime sleepiness, fatigue, trouble remembering things in daytime, trouble staying focused in daytime, irritability in daytime, and drowsy driving  Hypersomnia / narcolepsy symptoms: Patient denies symptoms of a hypersomnolence disorder such as sleep paralysis, sleep-related hallucinations, and cataplexy.   RLS symptoms: Frequency of RLS symptoms: once in 2 weeks  RLS symptoms affect sleep onset: Yes   RLS symptoms wakes patient up from sleep: Yes   Movements in sleep: POSITIVE for frequent leg kicks / jerks at night while asleep at times  Parasomnia symptoms: Patient denies symptoms of parasomnia such as sleepwalking, sleeptalking, sleep-eating, acting out dreams, and nightmares.      WEIGHT: stable     REVIEW OF SYSTEMS: All other systems have been reviewed and are negative.     PERTINENT SOCIAL HISTORY:  Occupation: desk job  Smoking: No   ETOH: Yes, socially  Marijuana: No   Caffeine: Yes, 2 cups of coffee in the morning  Sleep aids: Yes, sometimes Melatonin  Claustrophobia: No      PERTINENT PAST SURGICAL HISTORY:  non-contributory     PERTINENT FAMILY HISTORY:  Patient denies family history of any sleep disorder.  Patient denies family history of sleep apnea.     Active Problems, Allergy List, Medication List, and PMH/PSH/FH/Social Hx have been reviewed and reconciled in chart. No significant changes unless documented in the pertinent chart section. Updates made when necessary.        REVIEW OF SYSTEMS  All other systems have been reviewed and are  negative.      ALLERGIES  Allergies[1]    MEDICATIONS  Current Medications[2]          Objective       Physical Exam  Constitutional: Awake, not in distress  Lungs: Clear to auscultation bilateral, no rales  Heart: Regular rate and rhythm, no murmurs  Skin: Warm, no rash  Neuro: No tremors, moves all extremities  Psych: alert and oriented to time, place, and person    Assessment/Plan   Acosta Leigh is a 45 y.o. male presents today in [facility] with the following problems:    # SLEEP APNEA:  -Per CMS criteria, the patient is not eligible for a pap to treat her Obstructive sleep apnea.   -Start/ Continue [] cmH20 []PAP through [employer].  -Sleep apnea and PAP therapy education were provided at length in the clinic today. Acosta Leigh verbalized understanding.  -Emphasized diet, exercise, and weight loss in the clinic, as were non-supine sleep, avoiding alcohol in the late evening, and driving or operating heavy machinery when sleepy.  Acosta Leighverbalizes understanding of the above instructions and risks.    # CHRONIC SLEEP ONSET/ SLEEP MAINTENANCE INSOMNIA:  -likely due to poor sleep hygiene, and untreated sleep apnea.  -Sleep hygiene discuss in the clinic.     # BORDERLINE HYPERTENSION:  -Denies headache, palpitation, and syncope in the clinic.  -Follows with PCP/ Cardiology     # OBESITY:   -with a BMI of 32.88. Acosta Leigh most recent Bicarbonate was 28        Bicarbonate   Date Value Ref Range Status   03/29/2024 28 21 - 32 mmol/L Final   -Encourage to have regular exercise to manage weight well.     # XEROSTOMIA:  -Instruct Acosta Leighto purchase the Biotene gel to ease the dry mouth symptom,     # RESTLESS LEG SYNDROME: 1/ 2 weeks  -will reevaluate it after treatment     RTC 2-3 weeks after sleep study        All of patient's questions were answered. He verbalizes understanding and agreement with my assessment  and plan.    Please excuse any errors in grammar or translation related to dictation.         [1]   Allergies  Allergen Reactions    Penicillin G Hives    Penicillins Hives   [2]   Current Outpatient Medications   Medication Sig Dispense Refill    ketoconazole (NIZOral) 2 % cream Apply topically 2 times a day. 60 g 2    phentermine (Adipex-P) 37.5 mg tablet Take 1 tablet (37.5 mg) by mouth once daily. 30 tablet 0    propranolol (Inderal) 20 mg tablet Take 1 tablet (20 mg) by mouth 3 times a day. 90 tablet 11    sildenafil (Viagra) 50 mg tablet Take 2 tablets (100 mg) by mouth if needed for erectile dysfunction. 60 tablet 3     No current facility-administered medications for this visit.

## 2025-06-17 ENCOUNTER — APPOINTMENT (OUTPATIENT)
Dept: SLEEP MEDICINE | Facility: CLINIC | Age: 45
End: 2025-06-17
Payer: COMMERCIAL

## 2025-07-11 ENCOUNTER — APPOINTMENT (OUTPATIENT)
Dept: PAIN MEDICINE | Facility: CLINIC | Age: 45
End: 2025-07-11
Payer: COMMERCIAL